# Patient Record
Sex: FEMALE | Race: WHITE | Employment: FULL TIME | URBAN - METROPOLITAN AREA
[De-identification: names, ages, dates, MRNs, and addresses within clinical notes are randomized per-mention and may not be internally consistent; named-entity substitution may affect disease eponyms.]

---

## 2022-06-08 ENCOUNTER — HOSPITAL ENCOUNTER (INPATIENT)
Age: 22
LOS: 3 days | Discharge: HOSPICE | DRG: 417 | End: 2022-06-11
Attending: EMERGENCY MEDICINE | Admitting: INTERNAL MEDICINE

## 2022-06-08 ENCOUNTER — APPOINTMENT (OUTPATIENT)
Dept: GENERAL RADIOLOGY | Age: 22
DRG: 417 | End: 2022-06-08

## 2022-06-08 ENCOUNTER — ANESTHESIA (OUTPATIENT)
Dept: ENDOSCOPY | Age: 22
DRG: 417 | End: 2022-06-08

## 2022-06-08 ENCOUNTER — ANESTHESIA EVENT (OUTPATIENT)
Dept: ENDOSCOPY | Age: 22
DRG: 417 | End: 2022-06-08

## 2022-06-08 ENCOUNTER — APPOINTMENT (OUTPATIENT)
Dept: ULTRASOUND IMAGING | Age: 22
DRG: 417 | End: 2022-06-08

## 2022-06-08 DIAGNOSIS — K85.10 ACUTE BILIARY PANCREATITIS, UNSPECIFIED COMPLICATION STATUS: ICD-10-CM

## 2022-06-08 DIAGNOSIS — K80.50 CHOLEDOCHOLITHIASIS: ICD-10-CM

## 2022-06-08 DIAGNOSIS — K85.10 GALLSTONE PANCREATITIS: ICD-10-CM

## 2022-06-08 DIAGNOSIS — K85.90 ACUTE PANCREATITIS, UNSPECIFIED COMPLICATION STATUS, UNSPECIFIED PANCREATITIS TYPE: Primary | ICD-10-CM

## 2022-06-08 DIAGNOSIS — K80.42 CHOLEDOCHOLITHIASIS WITH ACUTE CHOLECYSTITIS: ICD-10-CM

## 2022-06-08 PROBLEM — E87.1 HYPONATREMIA: Status: ACTIVE | Noted: 2022-06-08

## 2022-06-08 PROBLEM — E66.9 OBESITY (BMI 30-39.9): Chronic | Status: ACTIVE | Noted: 2022-06-08

## 2022-06-08 LAB
ALBUMIN SERPL-MCNC: 4.1 G/DL (ref 3.5–5)
ALBUMIN/GLOB SERPL: 1.1 {RATIO} (ref 1.2–3.5)
ALP SERPL-CCNC: 166 U/L (ref 50–136)
ALT SERPL-CCNC: 383 U/L (ref 12–65)
ANION GAP SERPL CALC-SCNC: 6 MMOL/L (ref 7–16)
AST SERPL-CCNC: 87 U/L (ref 15–37)
BASOPHILS # BLD: 0 K/UL (ref 0–0.2)
BASOPHILS NFR BLD: 0 % (ref 0–2)
BILIRUB SERPL-MCNC: 0.9 MG/DL (ref 0.2–1.1)
BILIRUB UR QL: ABNORMAL
BUN SERPL-MCNC: 8 MG/DL (ref 6–23)
CALCIUM SERPL-MCNC: 9.3 MG/DL (ref 8.3–10.4)
CHLORIDE SERPL-SCNC: 101 MMOL/L (ref 98–107)
CO2 SERPL-SCNC: 27 MMOL/L (ref 21–32)
CREAT SERPL-MCNC: 0.6 MG/DL (ref 0.6–1)
DIFFERENTIAL METHOD BLD: ABNORMAL
EOSINOPHIL # BLD: 0 K/UL (ref 0–0.8)
EOSINOPHIL NFR BLD: 0 % (ref 0.5–7.8)
ERYTHROCYTE [DISTWIDTH] IN BLOOD BY AUTOMATED COUNT: 12.9 % (ref 11.9–14.6)
GLOBULIN SER CALC-MCNC: 3.7 G/DL (ref 2.3–3.5)
GLUCOSE SERPL-MCNC: 107 MG/DL (ref 65–100)
GLUCOSE UR QL STRIP.AUTO: NEGATIVE MG/DL
HCG UR QL: NEGATIVE
HCT VFR BLD AUTO: 42.5 % (ref 35.8–46.3)
HGB BLD-MCNC: 14.3 G/DL (ref 11.7–15.4)
IMM GRANULOCYTES # BLD AUTO: 0.1 K/UL (ref 0–0.5)
IMM GRANULOCYTES NFR BLD AUTO: 1 % (ref 0–5)
KETONES UR-MCNC: >160 MG/DL
LACTATE SERPL-SCNC: 1.2 MMOL/L (ref 0.4–2)
LEUKOCYTE ESTERASE UR QL STRIP: NEGATIVE
LIPASE SERPL-CCNC: 6486 U/L (ref 73–393)
LYMPHOCYTES # BLD: 1 K/UL (ref 0.5–4.6)
LYMPHOCYTES NFR BLD: 4 % (ref 13–44)
MCH RBC QN AUTO: 28.9 PG (ref 26.1–32.9)
MCHC RBC AUTO-ENTMCNC: 33.6 G/DL (ref 31.4–35)
MCV RBC AUTO: 86 FL (ref 79.6–97.8)
MONOCYTES # BLD: 1.3 K/UL (ref 0.1–1.3)
MONOCYTES NFR BLD: 5 % (ref 4–12)
NEUTS SEG # BLD: 21.2 K/UL (ref 1.7–8.2)
NEUTS SEG NFR BLD: 90 % (ref 43–78)
NITRITE UR QL: NEGATIVE
NRBC # BLD: 0 K/UL (ref 0–0.2)
PH UR: 6 [PH] (ref 5–9)
PLATELET # BLD AUTO: 259 K/UL (ref 150–450)
PMV BLD AUTO: 11.6 FL (ref 9.4–12.3)
POTASSIUM SERPL-SCNC: 3.8 MMOL/L (ref 3.5–5.1)
PROT SERPL-MCNC: 7.8 G/DL (ref 6.3–8.2)
PROT UR QL: ABNORMAL MG/DL
RBC # BLD AUTO: 4.94 M/UL (ref 4.05–5.2)
RBC # UR STRIP: ABNORMAL /UL
SERVICE CMNT-IMP: ABNORMAL
SODIUM SERPL-SCNC: 134 MMOL/L (ref 136–145)
SP GR UR: 1.02 (ref 1–1.02)
UROBILINOGEN UR QL: 0.2 EU/DL (ref 0.2–1)
WBC # BLD AUTO: 23.6 K/UL (ref 4.3–11.1)

## 2022-06-08 PROCEDURE — 74330 X-RAY BILE/PANC ENDOSCOPY: CPT

## 2022-06-08 PROCEDURE — 36415 COLL VENOUS BLD VENIPUNCTURE: CPT

## 2022-06-08 PROCEDURE — 2580000003 HC RX 258: Performed by: NURSE ANESTHETIST, CERTIFIED REGISTERED

## 2022-06-08 PROCEDURE — 7100000000 HC PACU RECOVERY - FIRST 15 MIN: Performed by: INTERNAL MEDICINE

## 2022-06-08 PROCEDURE — C1769 GUIDE WIRE: HCPCS | Performed by: INTERNAL MEDICINE

## 2022-06-08 PROCEDURE — BF101ZZ FLUOROSCOPY OF BILE DUCTS USING LOW OSMOLAR CONTRAST: ICD-10-PCS | Performed by: INTERNAL MEDICINE

## 2022-06-08 PROCEDURE — 96365 THER/PROPH/DIAG IV INF INIT: CPT

## 2022-06-08 PROCEDURE — 6360000002 HC RX W HCPCS: Performed by: INTERNAL MEDICINE

## 2022-06-08 PROCEDURE — 3609014900 HC ERCP W/SPHINCTEROTOMY &/OR PAPILLOTOMY: Performed by: INTERNAL MEDICINE

## 2022-06-08 PROCEDURE — 81003 URINALYSIS AUTO W/O SCOPE: CPT

## 2022-06-08 PROCEDURE — 6360000002 HC RX W HCPCS: Performed by: NURSE ANESTHETIST, CERTIFIED REGISTERED

## 2022-06-08 PROCEDURE — 83605 ASSAY OF LACTIC ACID: CPT

## 2022-06-08 PROCEDURE — 0F7C8DZ DILATION OF AMPULLA OF VATER WITH INTRALUMINAL DEVICE, VIA NATURAL OR ARTIFICIAL OPENING ENDOSCOPIC: ICD-10-PCS | Performed by: INTERNAL MEDICINE

## 2022-06-08 PROCEDURE — 83690 ASSAY OF LIPASE: CPT

## 2022-06-08 PROCEDURE — 96375 TX/PRO/DX INJ NEW DRUG ADDON: CPT

## 2022-06-08 PROCEDURE — 81025 URINE PREGNANCY TEST: CPT

## 2022-06-08 PROCEDURE — 7100000001 HC PACU RECOVERY - ADDTL 15 MIN: Performed by: INTERNAL MEDICINE

## 2022-06-08 PROCEDURE — 2580000003 HC RX 258: Performed by: INTERNAL MEDICINE

## 2022-06-08 PROCEDURE — 76705 ECHO EXAM OF ABDOMEN: CPT

## 2022-06-08 PROCEDURE — 2580000003 HC RX 258: Performed by: PHYSICIAN ASSISTANT

## 2022-06-08 PROCEDURE — 6370000000 HC RX 637 (ALT 250 FOR IP): Performed by: INTERNAL MEDICINE

## 2022-06-08 PROCEDURE — 3700000000 HC ANESTHESIA ATTENDED CARE: Performed by: INTERNAL MEDICINE

## 2022-06-08 PROCEDURE — 2500000003 HC RX 250 WO HCPCS: Performed by: NURSE ANESTHETIST, CERTIFIED REGISTERED

## 2022-06-08 PROCEDURE — 80053 COMPREHEN METABOLIC PANEL: CPT

## 2022-06-08 PROCEDURE — 2709999900 HC NON-CHARGEABLE SUPPLY: Performed by: INTERNAL MEDICINE

## 2022-06-08 PROCEDURE — 99285 EMERGENCY DEPT VISIT HI MDM: CPT

## 2022-06-08 PROCEDURE — 1100000000 HC RM PRIVATE

## 2022-06-08 PROCEDURE — 87040 BLOOD CULTURE FOR BACTERIA: CPT

## 2022-06-08 PROCEDURE — 3700000001 HC ADD 15 MINUTES (ANESTHESIA): Performed by: INTERNAL MEDICINE

## 2022-06-08 PROCEDURE — 6360000004 HC RX CONTRAST MEDICATION: Performed by: INTERNAL MEDICINE

## 2022-06-08 PROCEDURE — 2720000010 HC SURG SUPPLY STERILE: Performed by: INTERNAL MEDICINE

## 2022-06-08 PROCEDURE — 85025 COMPLETE CBC W/AUTO DIFF WBC: CPT

## 2022-06-08 PROCEDURE — 96361 HYDRATE IV INFUSION ADD-ON: CPT

## 2022-06-08 PROCEDURE — 6360000002 HC RX W HCPCS: Performed by: PHYSICIAN ASSISTANT

## 2022-06-08 RX ORDER — ONDANSETRON 2 MG/ML
INJECTION INTRAMUSCULAR; INTRAVENOUS PRN
Status: DISCONTINUED | OUTPATIENT
Start: 2022-06-08 | End: 2022-06-08 | Stop reason: SDUPTHER

## 2022-06-08 RX ORDER — ONDANSETRON 8 MG/1
4 TABLET, ORALLY DISINTEGRATING ORAL EVERY 8 HOURS PRN
Status: DISCONTINUED | OUTPATIENT
Start: 2022-06-08 | End: 2022-06-11 | Stop reason: HOSPADM

## 2022-06-08 RX ORDER — ENOXAPARIN SODIUM 100 MG/ML
40 INJECTION SUBCUTANEOUS EVERY 24 HOURS
Status: DISCONTINUED | OUTPATIENT
Start: 2022-06-08 | End: 2022-06-10

## 2022-06-08 RX ORDER — OXYCODONE HYDROCHLORIDE 5 MG/1
5 TABLET ORAL EVERY 6 HOURS PRN
Status: DISCONTINUED | OUTPATIENT
Start: 2022-06-08 | End: 2022-06-11 | Stop reason: HOSPADM

## 2022-06-08 RX ORDER — ONDANSETRON 2 MG/ML
4 INJECTION INTRAMUSCULAR; INTRAVENOUS
Status: COMPLETED | OUTPATIENT
Start: 2022-06-08 | End: 2022-06-08

## 2022-06-08 RX ORDER — LIDOCAINE HYDROCHLORIDE 20 MG/ML
INJECTION, SOLUTION EPIDURAL; INFILTRATION; INTRACAUDAL; PERINEURAL PRN
Status: DISCONTINUED | OUTPATIENT
Start: 2022-06-08 | End: 2022-06-08 | Stop reason: SDUPTHER

## 2022-06-08 RX ORDER — PROPOFOL 10 MG/ML
INJECTION, EMULSION INTRAVENOUS PRN
Status: DISCONTINUED | OUTPATIENT
Start: 2022-06-08 | End: 2022-06-08 | Stop reason: SDUPTHER

## 2022-06-08 RX ORDER — SODIUM CHLORIDE 0.9 % (FLUSH) 0.9 %
5-40 SYRINGE (ML) INJECTION EVERY 12 HOURS SCHEDULED
Status: DISCONTINUED | OUTPATIENT
Start: 2022-06-08 | End: 2022-06-10

## 2022-06-08 RX ORDER — HYDROMORPHONE HYDROCHLORIDE 1 MG/ML
0.5 INJECTION, SOLUTION INTRAMUSCULAR; INTRAVENOUS; SUBCUTANEOUS EVERY 4 HOURS PRN
Status: DISCONTINUED | OUTPATIENT
Start: 2022-06-08 | End: 2022-06-11 | Stop reason: HOSPADM

## 2022-06-08 RX ORDER — SODIUM CHLORIDE 9 MG/ML
INJECTION, SOLUTION INTRAVENOUS PRN
Status: DISCONTINUED | OUTPATIENT
Start: 2022-06-08 | End: 2022-06-10

## 2022-06-08 RX ORDER — KETOROLAC TROMETHAMINE 15 MG/ML
15 INJECTION, SOLUTION INTRAMUSCULAR; INTRAVENOUS
Status: COMPLETED | OUTPATIENT
Start: 2022-06-08 | End: 2022-06-08

## 2022-06-08 RX ORDER — ACETAMINOPHEN 325 MG/1
650 TABLET ORAL EVERY 6 HOURS PRN
Status: DISCONTINUED | OUTPATIENT
Start: 2022-06-08 | End: 2022-06-11 | Stop reason: HOSPADM

## 2022-06-08 RX ORDER — POLYETHYLENE GLYCOL 3350 17 G/17G
17 POWDER, FOR SOLUTION ORAL DAILY PRN
Status: DISCONTINUED | OUTPATIENT
Start: 2022-06-08 | End: 2022-06-09

## 2022-06-08 RX ORDER — SODIUM CHLORIDE 0.9 % (FLUSH) 0.9 %
5-40 SYRINGE (ML) INJECTION PRN
Status: DISCONTINUED | OUTPATIENT
Start: 2022-06-08 | End: 2022-06-10

## 2022-06-08 RX ORDER — DEXAMETHASONE SODIUM PHOSPHATE 10 MG/ML
INJECTION INTRAMUSCULAR; INTRAVENOUS PRN
Status: DISCONTINUED | OUTPATIENT
Start: 2022-06-08 | End: 2022-06-08 | Stop reason: SDUPTHER

## 2022-06-08 RX ORDER — SODIUM CHLORIDE, SODIUM LACTATE, POTASSIUM CHLORIDE, CALCIUM CHLORIDE 600; 310; 30; 20 MG/100ML; MG/100ML; MG/100ML; MG/100ML
INJECTION, SOLUTION INTRAVENOUS CONTINUOUS PRN
Status: DISCONTINUED | OUTPATIENT
Start: 2022-06-08 | End: 2022-06-08 | Stop reason: SDUPTHER

## 2022-06-08 RX ORDER — ROCURONIUM BROMIDE 10 MG/ML
INJECTION, SOLUTION INTRAVENOUS PRN
Status: DISCONTINUED | OUTPATIENT
Start: 2022-06-08 | End: 2022-06-08 | Stop reason: SDUPTHER

## 2022-06-08 RX ORDER — ONDANSETRON 2 MG/ML
4 INJECTION INTRAMUSCULAR; INTRAVENOUS EVERY 6 HOURS PRN
Status: DISCONTINUED | OUTPATIENT
Start: 2022-06-08 | End: 2022-06-11 | Stop reason: HOSPADM

## 2022-06-08 RX ORDER — ACETAMINOPHEN 650 MG/1
650 SUPPOSITORY RECTAL EVERY 6 HOURS PRN
Status: DISCONTINUED | OUTPATIENT
Start: 2022-06-08 | End: 2022-06-10

## 2022-06-08 RX ORDER — 0.9 % SODIUM CHLORIDE 0.9 %
1000 INTRAVENOUS SOLUTION INTRAVENOUS
Status: COMPLETED | OUTPATIENT
Start: 2022-06-08 | End: 2022-06-08

## 2022-06-08 RX ORDER — GLYCOPYRROLATE 0.2 MG/ML
INJECTION INTRAMUSCULAR; INTRAVENOUS PRN
Status: DISCONTINUED | OUTPATIENT
Start: 2022-06-08 | End: 2022-06-08 | Stop reason: SDUPTHER

## 2022-06-08 RX ORDER — NEOSTIGMINE METHYLSULFATE 1 MG/ML
INJECTION, SOLUTION INTRAVENOUS PRN
Status: DISCONTINUED | OUTPATIENT
Start: 2022-06-08 | End: 2022-06-08 | Stop reason: SDUPTHER

## 2022-06-08 RX ORDER — SODIUM CHLORIDE 9 MG/ML
INJECTION, SOLUTION INTRAVENOUS CONTINUOUS
Status: DISCONTINUED | OUTPATIENT
Start: 2022-06-08 | End: 2022-06-09

## 2022-06-08 RX ADMIN — SODIUM CHLORIDE, SODIUM LACTATE, POTASSIUM CHLORIDE, AND CALCIUM CHLORIDE: 600; 310; 30; 20 INJECTION, SOLUTION INTRAVENOUS at 15:40

## 2022-06-08 RX ADMIN — PROPOFOL 200 MG: 10 INJECTION, EMULSION INTRAVENOUS at 15:49

## 2022-06-08 RX ADMIN — ONDANSETRON 4 MG: 2 INJECTION INTRAMUSCULAR; INTRAVENOUS at 15:58

## 2022-06-08 RX ADMIN — PHENYLEPHRINE HYDROCHLORIDE 100 MCG: 10 INJECTION INTRAVENOUS at 16:15

## 2022-06-08 RX ADMIN — SODIUM CHLORIDE: 9 INJECTION, SOLUTION INTRAVENOUS at 18:27

## 2022-06-08 RX ADMIN — GLYCOPYRROLATE 0.6 MG: 0.2 INJECTION, SOLUTION INTRAMUSCULAR; INTRAVENOUS at 16:25

## 2022-06-08 RX ADMIN — ENOXAPARIN SODIUM 40 MG: 100 INJECTION SUBCUTANEOUS at 21:30

## 2022-06-08 RX ADMIN — INDOMETHACIN 100 MG: 50 SUPPOSITORY RECTAL at 16:23

## 2022-06-08 RX ADMIN — ONDANSETRON 4 MG: 2 INJECTION INTRAMUSCULAR; INTRAVENOUS at 10:28

## 2022-06-08 RX ADMIN — Medication 5 MG: at 16:25

## 2022-06-08 RX ADMIN — KETOROLAC TROMETHAMINE 15 MG: 15 INJECTION, SOLUTION INTRAMUSCULAR; INTRAVENOUS at 10:29

## 2022-06-08 RX ADMIN — SODIUM CHLORIDE 1000 ML: 9 INJECTION, SOLUTION INTRAVENOUS at 10:28

## 2022-06-08 RX ADMIN — IOPAMIDOL 7.5 ML: 755 INJECTION, SOLUTION INTRAVENOUS at 16:25

## 2022-06-08 RX ADMIN — ROCURONIUM BROMIDE 35 MG: 50 INJECTION, SOLUTION INTRAVENOUS at 15:50

## 2022-06-08 RX ADMIN — PIPERACILLIN AND TAZOBACTAM 3375 MG: 3; .375 INJECTION, POWDER, LYOPHILIZED, FOR SOLUTION INTRAVENOUS at 18:28

## 2022-06-08 RX ADMIN — LIDOCAINE HYDROCHLORIDE 80 MG: 20 INJECTION, SOLUTION EPIDURAL; INFILTRATION; INTRACAUDAL; PERINEURAL at 15:49

## 2022-06-08 RX ADMIN — DEXAMETHASONE SODIUM PHOSPHATE 10 MG: 10 INJECTION INTRAMUSCULAR; INTRAVENOUS at 15:58

## 2022-06-08 RX ADMIN — PIPERACILLIN AND TAZOBACTAM 4500 MG: 4; .5 INJECTION, POWDER, LYOPHILIZED, FOR SOLUTION INTRAVENOUS at 12:05

## 2022-06-08 ASSESSMENT — PAIN - FUNCTIONAL ASSESSMENT
PAIN_FUNCTIONAL_ASSESSMENT: NONE - DENIES PAIN
PAIN_FUNCTIONAL_ASSESSMENT: 0-10
PAIN_FUNCTIONAL_ASSESSMENT: 0-10

## 2022-06-08 ASSESSMENT — ENCOUNTER SYMPTOMS
RHINORRHEA: 0
VOMITING: 1
ABDOMINAL PAIN: 1
NAUSEA: 1
EYE REDNESS: 0
BACK PAIN: 0
SORE THROAT: 0
COUGH: 0
CHEST TIGHTNESS: 0
SHORTNESS OF BREATH: 0
DIARRHEA: 0
ABDOMINAL DISTENTION: 0

## 2022-06-08 ASSESSMENT — PAIN SCALES - GENERAL: PAINLEVEL_OUTOF10: 5

## 2022-06-08 ASSESSMENT — PAIN DESCRIPTION - DESCRIPTORS
DESCRIPTORS: DISCOMFORT;CRAMPING
DESCRIPTORS: DULL;OTHER (COMMENT)

## 2022-06-08 ASSESSMENT — PAIN DESCRIPTION - LOCATION: LOCATION: ABDOMEN

## 2022-06-08 NOTE — INTERVAL H&P NOTE
Update History & Physical    The patient's History and Physical of June 8, 2022   was reviewed with the patient and I examined the patient. There was no change. The surgical site was confirmed by the patient and me. Plan: The risks, benefits, expected outcome, and alternative to the recommended procedure have been discussed with the patient. Patient understands and wants to proceed with the procedure.      Electronically signed by Corrie Lobo MD on 6/8/2022 at 3:41 PM

## 2022-06-08 NOTE — ANESTHESIA POSTPROCEDURE EVALUATION
Department of Anesthesiology  Postprocedure Note    Patient: Selma Escamilla  MRN: [id number]  [place]: 2000  Date of evaluation: 6/8/2022  Time:  5:33 PM     Procedure Summary     Date: 06/08/22 Room / Location: [facility] ENDO FLOURO 1 / [facility] ENDOSCOPY    Anesthesia Start: 1540 Anesthesia Stop: 7157    Procedure: ERCP SPHINCTER/PAPILLOTOMY with balloon sweep (N/A Upper GI Region) Diagnosis:       Elevated liver enzymes      Acute pancreatitis, unspecified complication status, unspecified pancreatitis type      (Elevated liver enzymes [R74.8])      (Acute pancreatitis, unspecified complication status, unspecified pancreatitis type [K85.90])    Surgeons: Isai Alva MD Responsible Provider: Aquilse Fenton MD    Anesthesia Type: general ASA Status: 3          Anesthesia Type: general    Moses Phase I: Moses Score: 8    Moses Phase II:      Last vitals: Reviewed and per EMR flowsheets.        Anesthesia Post Evaluation    Patient location during evaluation: PACU  Patient participation: complete - patient participated  Level of consciousness: awake and alert  Pain score: 2  Airway patency: patent  Nausea & Vomiting: no nausea and no vomiting  Complications: no  Cardiovascular status: blood pressure returned to baseline  Respiratory status: acceptable  Hydration status: euvolemic  Comments: /73   Pulse 64   Temp 98.7 °F (37.1 °C) (Temporal)   Resp 16   Ht 5' 5\" (1.651 m)   Wt 193 lb (87.5 kg)   SpO2 100%   BMI 32.12 kg/m²   Multimodal analgesia pain management approach

## 2022-06-08 NOTE — CONSULTS
[facility] Consult Note       Primary GI Physician: Dilma Celaya MD (new)  Referring Provider: Aurora Kumar PA-C    Consult Date:  6/8/2022  Admit Date:  6/8/2022    Chief Complaint:  Gallstone pancreatitis    Subjective:     History of Present Illness:  Patient is a 23 y.o. female with known gallstones, but otherwise no other significant PMHx who is seen in consultation at the request of Aurora Kumar PA-C for further evaluation of gallstone pancreatitis. Patient presented to the ED today with a 2 day history of moderate to severe, constant upper abdominal pressure pains. + N/V, food stuff emesis. No CG emesis, hematemesis, or melena. No F/C. Patient has had similar abdominal pains in the past. She reports having seen a surgeon 4 years ago for same. Surgeon recommended CCY, but patient elected to defer surgery due to lack of insurance. She has been seeing a Chiropractor who has treated her gallstones with bile salts and a medication to keep her bile ducts dilated. She denies tobacco, NSAIDs, or alcohol use. Her pain has improved in the ER after getting Toradol. Labs in the ED revealed , , AST 87, bili 0.9, lipase of 6486, and WBC of 23k. She denies prior hx of pancreatitis. RUQ abdominal US showed cholelithiasis with mild GB wall thickening and dilated CBD and intrahepatic ducts. She has multiple family members with GB disease especially on her mother's side. PMH:  Gallstones    PSH:  None.     Allergies:  No Known Allergies    Home Medications:  Prior to Admission medications    Not on [facility] Medications:  Current Facility-Administered Medications   Medication Dose Route Frequency    piperacillin-tazobactam (ZOSYN) 3,375 mg in sodium chloride 0.9 % 50 mL IVPB (mini-bag)  3,375 mg IntraVENous Once    Followed by    piperacillin-tazobactam (ZOSYN) 3,375 mg in sodium chloride 0.9 % 50 mL IVPB (mini-bag)  3,375 mg IntraVENous Q8H     No current outpatient medications on file. Social History:  Social History     Tobacco Use    Smoking status: Not on file    Smokeless tobacco: Not on file   Substance Use Topics    Alcohol use: Not on file       Pt denies any history of drug use, blood transfusions, or tattoos. Family History:  Multiple family members with GB disease. Review of Systems:  A detailed 10 system ROS is obtained, with pertinent positives as listed above. All others are negative. Diet:  NPO    Objective:     Physical Exam:  Vitals:  BP (!) 135/97   Pulse 100   Temp 98.4 °F (36.9 °C) (Oral)   Resp 18   Ht 5' 5\" (1.651 m)   Wt 193 lb (87.5 kg)   SpO2 99%   BMI 32.12 kg/m²   Gen:  Pt is alert, cooperative, NAD. Skin:  Extremities and face reveal no rashes. HEENT: Sclerae anicteric. Extra-occular muscles are intact. No oral ulcers. No abnormal pigmentation of the lips. The neck is supple. Cardiovascular: SINUS TACHY. No murmurs, gallops, or rubs. Respiratory:  Comfortable breathing with no accessory muscle use. Clear breath sounds anteriorly with no wheezes, rales, or rhonchi. GI:  Abdomen nondistended, soft, and MILDLY TTP EPIGASTRIC without G/R.  NABS. No enlargement of the liver or spleen. No masses palpable. Rectal:  Deferred  Musculoskeletal:  No pitting edema of the lower legs. Neurological:  Gross memory appears intact. Patient is alert and oriented. Psychiatric:  Mood appears appropriate with judgement intact. Lymphatic:  No cervical or supraclavicular adenopathy. Laboratory:    Recent Labs     06/08/22  0938   WBC 23.6*   HGB 14.3   HCT 42.5      MCV 86.0   *   K 3.8      CO2 27   BUN 8   AST 87*   *          Assessment:     24 y/o F with known gallstones who presents with recent onset of upper abdominal pains with N/V. Work-up in the ER revealed , AST 87, , and normal T bili. Lipase elevated in the 6000s. WBC 23k.  US revealed dilated CBD and intrahepatics raising the suspicion for CBD stone / gallstone pancreatitis, gallstones +/- acute or chronic cholecystitis. She saw a surgeon several years ago for similar abdominal pains and at that time deferred CCY due to lack of insurance. Plan:     ERCP later today to evaluate biliary tree and to remove gallstones, sludge, debris etc. The risks of procedure were discussed with patient including anesthesia, bleeding, infection, perforation, or worsening pancreatitis. Patient verbalizes understanding and wishes to pursue ERCP. Hospitalist to admit. Agree with surgical consult. Anti-emetics and pain mgt per primary. Further recs pending ERCP results. Patient is seen and examined in collaboration with Dr. Ghulam Horn. Assessment and plan as per Dr. Robert Billings.       Deborah Fuentes PA-C  [facility]

## 2022-06-08 NOTE — ED NOTES
TRANSFER - OUT REPORT:    Verbal report given to endoscopy RN on [facility]  being transferred to endoscopy  for ordered procedure       Report consisted of patient's Situation, Background, Assessment and   Recommendations(SBAR). Information from the following report(s) Nurse Handoff Report, ED SBAR, [facility] and Recent Results was reviewed with the receiving nurse. Lines:   Peripheral IV 06/08/22 Right Antecubital (Active)        Opportunity for questions and clarification was provided.       Patient transported with:  Dione Cross RN  06/08/22 4350

## 2022-06-08 NOTE — CONSULTS
H&P/Consult Note/Progress Note/Office Note:   Angela Acosta  MRN: [id number]  GEQ:[birth year]  Age:22 y.o.    HPI: Angela Acosta is a 23 y.o. female who came to the ER and was admitted by the Hospitalists with a  3 day h/o progressive N/V/upper abd pain/epigastric pain. Nothing made pain better or worse. Pain 5/10 on arrival   No associated fever. She has been known to have gallstones for 3 yrs but trying homeopathic treatment instead of surgery    In ER WBC 23.6k, T Bili 0.9, AST/ALT 87/383, alk phosp 166  Lipase 6486    GI and General Surgery were consulted by MUNA Ellison  She had ERCP on day of admission  6/8/22 s/p ERCP; Dr Scot Swann  CBD 15mm, minimal sludge and likely a prior passed stone; pancreas normal, GB not seen        6/8/22 RUQ Abd US  Homogeneous echotexture throughout the liver. There is mild    intrahepatic biliary ductal dilatation. There is no focal hepatic mass. Gallstones are present within the gallbladder lumen. There is gallbladder wall   thickening measuring 5 mm. No pericholecystic fluid. There was a   reported negative sonographic Jackson's sign. CBD dilated measuring 14mm. Visualized portions of the pancreas were unremarkable however the tail was   obscured by overlying bowel gas. The right kidney is unremarkable without   hydronephrosis or solid mass. The right kidney was measured at approximately 11.1cm. The visualized portions of the abdominal aorta and inferior vena cava are   unremarkable. There is normal hepatopetal flow within the main portal vein. No   free fluid is seen within the right upper quadrant. mpression   1. Cholelithiasis with mild gallbladder wall thickening raising the possibility   of acute or chronic cholecystitis. 2. Dilated common bile duct as well as intrahepatic biliary ductal dilatation. A   distal stone or stricture could account for this appearance.  An MRCP or ERCP may   be beneficial there is further clinical concern. History reviewed. No pertinent past medical history. Past Surgical History:   Procedure Laterality Date    ERCP W/ SPHICTEROTOMY N/A 06/08/2022     Current Facility-Administered Medications   Medication Dose Route Frequency    dextrose 5 % and 0.45 % NaCl with KCl 20 mEq infusion   IntraVENous Continuous    sodium chloride flush 0.9 % injection 5-40 mL  5-40 mL IntraVENous 2 times per day    sodium chloride flush 0.9 % injection 5-40 mL  5-40 mL IntraVENous PRN    0.9 % sodium chloride infusion   IntraVENous PRN    enoxaparin (LOVENOX) injection 40 mg  40 mg SubCUTAneous Q24H    ondansetron (ZOFRAN-ODT) disintegrating tablet 4 mg  4 mg Oral Q8H PRN    Or    ondansetron (ZOFRAN) injection 4 mg  4 mg IntraVENous Q6H PRN    acetaminophen (TYLENOL) tablet 650 mg  650 mg Oral Q6H PRN    Or    acetaminophen (TYLENOL) suppository 650 mg  650 mg Rectal Q6H PRN    piperacillin-tazobactam (ZOSYN) 3,375 mg in sodium chloride 0.9 % 50 mL IVPB (mini-bag)  3,375 mg IntraVENous q8h    HYDROmorphone HCl PF (DILAUDID) injection 0.5 mg  0.5 mg IntraVENous Q4H PRN    oxyCODONE (ROXICODONE) immediate release tablet 5 mg  5 mg Oral Q6H PRN    indomethacin (INDOCIN) 50 MG suppository 100 mg  100 mg Rectal Q12H PRN    glucagon (rDNA) injection 1 mg  1 mg IntraVENous Once     Patient has no known allergies.   Social History     Socioeconomic History    Marital status: Single     Spouse name: None    Number of children: None    Years of education: None    Highest education level: None   Occupational History    None   Tobacco Use    Smoking status: Never Smoker    Smokeless tobacco: Never Used   Substance and Sexual Activity    Alcohol use: None    Drug use: None    Sexual activity: None   Other Topics Concern    None   Social History Narrative    None     Social Determinants of Health     Financial Resource Strain:     Difficulty of Paying Living Expenses: Not on file   Food Insecurity:     Worried About Running Out of Food in the Last Year: Not on file    Dm of Food in the Last Year: Not on file   Transportation Needs:     Lack of Transportation (Medical): Not on file    Lack of Transportation (Non-Medical): Not on file   Physical Activity:     Days of Exercise per Week: Not on file    Minutes of Exercise per Session: Not on file   Stress:     Feeling of Stress : Not on file   Social Connections:     Frequency of Communication with Friends and Family: Not on file    Frequency of Social Gatherings with Friends and Family: Not on file    Attends Mu-ism Services: Not on file    Active Member of [address] [place] [address] [employer] or Organizations: Not on file    Attends Club or Organization Meetings: Not on file    Marital Status: Not on file   Intimate Partner Violence:     Fear of Current or Ex-Partner: Not on file    Emotionally Abused: Not on file    Physically Abused: Not on file    Sexually Abused: Not on file   Housing Stability:     Unable to Pay for Housing in the Last Year: Not on file    Number of [place] in the Last Year: Not on file    Unstable Housing in the Last Year: Not on file     Social History     Tobacco Use   Smoking Status Never Smoker   Smokeless Tobacco Never Used     History reviewed. No pertinent family history. ROS: The patient has no difficulty with chest pain or shortness of breath. No fever or chills. Comprehensive review of systems was otherwise unremarkable except as noted above.     Physical Exam:   /67   Pulse 63   Temp 98.3 °F (36.8 °C) (Oral)   Resp 16   Ht 5' 5\" (1.651 m)   Wt 193 lb (87.5 kg)   SpO2 97%   BMI 32.12 kg/m²   Vitals:    06/08/22 1749 06/08/22 1920 06/08/22 2348 06/09/22 0428   BP: 122/73 114/65 122/85 120/67   Pulse: 74 70 67 63   Resp: 16 16 16 16   Temp: 98.6 °F (37 °C) 98.6 °F (37 °C) 98.6 °F (37 °C) 98.3 °F (36.8 °C)   TempSrc: Oral Oral Oral Oral   SpO2: 98% 97% 96% 97%   Weight:       Height:         06/08 1901 - 06/09 0700  In: 911 [I.V.:911]  Out: -   06/07 0701 - 06/08 1900  In: 1750 [I.V.:650]  Out: 0     Constitutional: Alert, oriented, cooperative patient in no acute distress; appears stated age    Eyes:Sclera are clear. EOMs intact  ENMT: no external lesions gross hearing normal; no obvious neck masses, no ear or lip lesions, nares normal  CV: RRR. Normal perfusion  Resp: No JVD. Breathing is  non-labored; no audible wheezing. GI: soft and non-distended; epigastric tenderness     Musculoskeletal: unremarkable with normal function. No embolic signs or cyanosis.    Neuro:  Oriented; moves all 4; no focal deficits  Psychiatric: normal affect and mood, no memory impairment    Recent vitals (if inpt):  Patient Vitals for the past 24 hrs:   BP Temp Temp src Pulse Resp SpO2 Height Weight   06/09/22 0428 120/67 98.3 °F (36.8 °C) Oral 63 16 97 %     06/08/22 2348 122/85 98.6 °F (37 °C) Oral 67 16 96 %     06/08/22 1920 114/65 98.6 °F (37 °C) Oral 70 16 97 %     06/08/22 1749 122/73 98.6 °F (37 °C) Oral 74 16 98 %     06/08/22 1735 121/67 98 °F (36.7 °C) Temporal 76 16 97 %     06/08/22 1730 125/69   63 15 100 %     06/08/22 1725 128/73   64 16 100 %     06/08/22 1720 130/69   62 16 100 %     06/08/22 1715 124/67   71 16 100 %     06/08/22 1710 126/74   65 15 100 %     06/08/22 1705 124/69   67 14 100 %     06/08/22 1700 125/67   69 16 100 %     06/08/22 1654 121/68   72 16 100 %     06/08/22 1649 125/65 98.7 °F (37.1 °C) Temporal 80 14 100 %     06/08/22 1404 129/73 98 °F (36.7 °C) Oral 85 16 98 % 5' 5\" (1.651 m) 193 lb (87.5 kg)   06/08/22 1319 127/78     98 %     06/08/22 1300 120/72     96 %     06/08/22 1249 125/82     96 %     06/08/22 1234 116/81     97 %     06/08/22 1215 102/71     98 %     06/08/22 1003 120/81     97 %     06/08/22 0948 (!) 115/97     97 %     06/08/22 0931 (!) 135/97 98.4 °F (36.9 °C) Oral 100 18 99 % 5' 5\" (1.651 m) 193 lb (87.5 kg)       Amount and/or Complexity of Data Reviewed and Analyzed:  I reviewed and analyzed all of the unique labs and radiologic studies that are shown below as well as any that are in the HPI, and any that are in the expanded problem list below  *Each unique test, order, or document contributes to the combination of 2 or combination of 3 in Category 1 below. For this visit I also reviewed old records and prior notes. Recent Labs     06/09/22  0327   WBC 18.9*   HGB 11.6*         K 3.6      CO2 22   BUN 10   *     Review of most recent CBC  Lab Results   Component Value Date    WBC 18.9 (H) 06/09/2022    HGB 11.6 (L) 06/09/2022    HCT 35.3 (L) 06/09/2022    MCV 89.4 06/09/2022     06/09/2022       Review of most recent BMP  Lab Results   Component Value Date     06/09/2022    K 3.6 06/09/2022     06/09/2022    CO2 22 06/09/2022    BUN 10 06/09/2022    CREATININE 0.50 06/09/2022    GLUCOSE 75 06/09/2022    CALCIUM 8.5 06/09/2022        Review of most recent LFTs (and lipase if done)  Lab Results   Component Value Date     (H) 06/09/2022    AST 38 (H) 06/09/2022    ALKPHOS 125 06/09/2022    BILITOT 0.6 06/09/2022     Lab Results   Component Value Date    LIPASE 6,486 (H) 06/08/2022       No results found for: INR, APTT, LCAD    Review of most recent HgbA1c  No results found for: LABA1C  No results found for: EAG    Nutritional assessment screen for wound healing issues:  Lab Results   Component Value Date    LABALBU 3.1 (L) 06/09/2022       @lastcovr@    Xray Result (most recent):  No results found for this or any previous visit from the past 3650 days. CT Result (most recent):  No results found for this or any previous visit from the past 3650 days.     US Result (most recent):  US ABDOMEN LIMITED 06/08/2022    Narrative  Right upper quadrant ultrasound    HISTORY: Nausea and vomiting, history of gallstones    Real-time sonography of the right upper quadrant was performed. Comparison: None    FINDINGS: There is homogeneous echotexture throughout the liver. There is mild  intrahepatic biliary ductal dilatation. There is no focal hepatic mass. Gallstones are present within the gallbladder lumen. There is gallbladder wall  thickening measuring 5 mm. There is no pericholecystic fluid. There was a  reported negative sonographic Jackson's sign. The common bile duct is dilated  measuring 14 mm. The visualized portions of the pancreas were unremarkable however the tail was  obscured by overlying bowel gas. The right kidney is unremarkable without  hydronephrosis or solid mass. The right kidney was measured at approximately  11.1 cm. The visualized portions of the abdominal aorta and inferior vena cava are  unremarkable. There is normal hepatopetal flow within the main portal vein. No  free fluid is seen within the right upper quadrant. Impression  1. Cholelithiasis with mild gallbladder wall thickening raising the possibility  of acute or chronic cholecystitis. 2. Dilated common bile duct as well as intrahepatic biliary ductal dilatation. A  distal stone or stricture could account for this appearance. An MRCP or ERCP may  be beneficial there is further clinical concern. Admission date (for inpatients): 6/8/2022   Day of Surgery  Procedure(s):  ERCP ENDOSCOPIC RETROGRADE CHOLANGIOPANCREATOGRAPHY  ER 1        ASSESSMENT/PLAN:  [unfilled]  Principal Problem:    Gallstone pancreatitis  Active Problems:    Choledocholithiasis with acute cholecystitis    Hyponatremia    Obesity (BMI 30-39. 9)    Leukocytosis    Elevated LFTs    Dilated cbd, acquired  Resolved Problems:    * No resolved hospital problems.  *     Patient Active Problem List    Diagnosis Date Noted    Gallstone pancreatitis 06/09/2022     Priority: Medium    Leukocytosis 06/09/2022     Priority: Medium    Elevated LFTs 06/09/2022     Priority: Medium    Dilated cbd, acquired 06/09/2022     Priority: Medium    Choledocholithiasis with acute cholecystitis 06/08/2022     Priority: Medium    Hyponatremia 06/08/2022     Priority: Medium    Obesity (BMI 30-39.9) 06/08/2022     Priority: Medium          Number and Complexity of Problems addressed and   Risks of complications and/or morbidity of management          Acute gallstone pancreatitis with biliary obstruction  Admit inpt  WBC 23.6--->18.9k  s/p ERCP on 6/8/22 with 15mm CBD with sludge; likely passed stone    NPO/IVF/IV Abx    Await resolution of pancreatitis  Interval cholecystectomy prior to discharge            Level of MDM (2/3 elements below)  Number and Complexity of Problems Addressed Amount and/or Complexity of Data to be Reviewed and Analyzed  *Each unique test, order, or document contributes to the combination of 2 or combination of 3 in Category 1 below.  Risk of Complications and/or Morbidity or Mortality of pt Management     56275  39754 SF Minimal  1self-limited or minor problem Minimal or none Minimal risk of morbidity from additional diagnostic testing or Rx   61878  05795 Low Low  2or more self-limited or minor problems;    or  1stable chronic illness;    or  3NOQFI, uncomplicated illness or injury   Limited  (Must meet the requirements of at least 1 of the 2 categories)  Category 1: Tests and documents   Any combination of 2 from the following:  Review of prior external note(s) from each unique source*;  review of the result(s) of each unique test*;   ordering of each unique test*    or   Category 2: Assessment requiring an independent historian(s)  (For the categories of independent interpretation of tests and discussion of management or test interpretation, see moderate or high) Low risk of morbidity from additional diagnostic testing or treatment     28947  27069 Mod Moderate  1or more chronic illnesses with exacerbation, progression, or side effects of treatment;    or  2or more stable chronic illnesses;    or  1undiagnosed new problem with uncertain prognosis;    or  1acute illness with systemic symptoms;    or  [id number] complicated injury   Moderate  (Must meet the requirements of at least 1 out of 3 categories)  Category 1: Tests, documents, or independent historian(s)  Any combination of 3 from the following:   Review of prior external note(s) from each unique source*;  Review of the result(s) of each unique test*;  Ordering of each unique test*;  Assessment requiring an independent historian(s)    or  Category 2: Independent interpretation of tests   Independent interpretation of a test performed by another physician/other qualified health care professional (not separately reported);     or  Category 3: Discussion of management or test interpretation  Discussion of management or test interpretation with external physician/other qualified health care professional/appropriate source (not separately reported)   Moderate risk of morbidity from additional diagnostic testing or treatment  Examples only:  Prescription drug management   Decision regarding minor surgery with identified patient or procedure risk factors  Decision regarding elective major surgery without identified patient or procedure risk factors   Diagnosis or treatment significantly limited by social determinants of health       96967  75874 High High  1or more chronic illnesses with severe exacerbation, progression, or side effects of treatment;    or  1 acute or chronic illness or injury that poses a threat to life or bodily function   Extensive  (Must meet the requirements of at least 2 out of 3 categories)  Category 1: Tests, documents, or independent historian(s)  Any combination of 3 from the following:   Review of prior external note(s) from each unique source*;  Review of the result(s) of each unique test*;   Ordering of each unique test*;   Assessment requiring an independent historian(s)    or   Category 2: Independent interpretation of tests   Independent interpretation of a test performed by another physician/other qualified health care professional (not separately reported);     or  Category 3: Discussion of management or test interpretation  Discussion of management or test interpretation with external physician/other qualified health care professional/appropriate source (not separately reported)   High risk of morbidity from additional diagnostic testing or treatment  Examples only:  Drug therapy requiring intensive monitoring for toxicity  Decision regarding elective major surgery with identified patient or procedure risk factors  Decision regarding emergency major surgery  Decision regarding hospitalization  Decision not to resuscitate or to de-escalate care because of poor prognosis             I have personally performed a face-to-face diagnostic evaluation and management  service on this patient. I have independently seen the patient. I have independently obtained the above history from the patient/family. I have independently examined the patient with above findings. I have independently reviewed data/labs for this patient and developed the above plan of care (MDM).       Signed: Christal Larios MD  6/9/2022    6:25 AM

## 2022-06-08 NOTE — ED TRIAGE NOTES
Pt ambulatory to triage. Pt states that since Monday she has had gas and bloating in her stomach. Pt states she was dx with gallstones 3 years and has been trying to manage them at home. Pt states she cannot keep any food or liquid down x2days now. Pt states the pain is diffuse all over the abdomen and wraps around to her back. Pt denies fever/chills, shob, chest pain, denies dysuria, hematuria, urinary urgency or frequency.

## 2022-06-08 NOTE — H&P
Hospitalist History and Physical   Admit Date:  2022  9:40 AM   Name:  Darlene Merino   Age:  23 y.o. Sex:  female  :  2000   MRN:  [id number]   Room:  ER01/    Presenting Complaint: Abdominal Pain     Reason(s) for Admission: Choledocholithiasis with acute cholecystitis [K80.42]     History of Present Illness:     Darlene Merino is a 23 y.o. female with medical history of gallstones, obesity evaluated with 3 days of nausea, emesis, abdominal pain. She is found with elevated lipase and liver functions. ABDOMINAL US shows \"     Impression   1. Cholelithiasis with mild gallbladder wall thickening raising the possibility   of acute or chronic cholecystitis. 2. Dilated common bile duct as well as intrahepatic biliary ductal dilatation. A   distal stone or stricture could account for this appearance. An MRCP or ERCP may   be beneficial there is further clinical concern. \"    She has received zosyn in the ED. She does not meet sepsis critteria, though has leukocytosis >23K. GI/ surgery are consulted. UA and UPT negative. Pain over RUQ. Unable to keep food down. No diarrhea. denies fever, no ear or throat pain, some weight loss, no sweats, no edema, no chest pain or cough        Emergency contact- mother Veryl Mis [phone]  No home meds      Review of Systems:  10 systems reviewed and negative except as noted in HPI.         Assessment & Plan:     Principal Problem:    Choledocholithiasis with acute cholecystitis  Plan:   · Admit to medical bed  · NPO  ·  cc/hr   · Zosyn D1  · followup blood cultures   · Trend liver enzymes  · GI/ surgery consults   · Consider MRCP/ERCP- defer to GI        Active Problems:    Hyponatremia  Plan:   ·   · Trend BMP with hydration       Obesity:  · Needs lifestyle modification           Dispo/Discharge Planning:     Pending to home     Diet: Diet NPO  VTE ppx: lovenox  Code status: FULL         Hospital Problems:  Principal Problem: Choledocholithiasis with acute cholecystitis  Active Problems:    Hyponatremia  Resolved Problems:    * No resolved hospital problems. *       Past History:   past medical history -gallstones, obesity       past surgical history on file. - wisdom teeth      No Known Allergies   Social History     Tobacco Use    Smoking status: No    Smokeless tobacco: No   Substance Use Topics    Alcohol use: No       family history -CHF, HTN, skin cancer, HLP, , gallstones           There is no immunization history on file for this patient. Prior to Admit Medications:  No current outpatient medications      Objective:     Patient Vitals for the past 24 hrs:   Temp Pulse Resp BP SpO2   06/08/22 1215    102/71 98 %   06/08/22 1003    120/81 97 %   06/08/22 0948    (!) 115/97 97 %   06/08/22 0931 98.4 °F (36.9 °C) 100 18 (!) 135/97 99 %       Oxygen Therapy  SpO2: 98 %  O2 Device: None (Room air)    Estimated body mass index is 32.12 kg/m² as calculated from the following:    Height as of this encounter: 5' 5\" (1.651 m). Weight as of this encounter: 193 lb (87.5 kg). Intake/Output Summary (Last 24 hours) at 6/8/2022 1227  Last data filed at 6/8/2022 1129  Gross per 24 hour   Intake 1000 ml   Output    Net 1000 ml         Physical Exam:    Blood pressure 102/71, pulse 100, temperature 98.4 °F (36.9 °C), temperature source Oral, resp. rate 18, height 5' 5\" (1.651 m), weight 193 lb (87.5 kg), SpO2 98 %. General:    Well nourished. No overt distress, obese, pleasant   Head:  Normocephalic, atraumatic  Eyes:  Sclerae appear normal.  Pupils equally round. ENT:  Nares appear normal, no drainage. Moist oral mucosa, hirsutism  Neck:  No restricted ROM. Trachea midline   CV:   RRR. No m/r/g. No jugular venous distension. Lungs:   CTAB. No wheezing, rhonchi, or rales. Respirations even, unlabored  Abdomen: Bowel sounds present. Soft, tender, nondistended. Extremities: No cyanosis or clubbing.   No edema  Skin: No rashes and normal coloration. Warm and dry. Neuro:   grossly intact. Psych:  Normal mood and affect.       I have reviewed ordered lab tests and independently visualized imaging below:    Last 24hr Labs:  Recent Results (from the past 24 hour(s))   CBC with Diff    Collection Time: 06/08/22  9:38 AM   Result Value Ref Range    WBC 23.6 (H) 4.3 - 11.1 K/uL    RBC 4.94 4.05 - 5.2 M/uL    Hemoglobin 14.3 11.7 - 15.4 g/dL    Hematocrit 42.5 35.8 - 46.3 %    MCV 86.0 79.6 - 97.8 FL    MCH 28.9 26.1 - 32.9 PG    MCHC 33.6 31.4 - 35.0 g/dL    RDW 12.9 11.9 - 14.6 %    Platelets 750 821 - 208 K/uL    MPV 11.6 9.4 - 12.3 FL    nRBC 0.00 0.0 - 0.2 K/uL    Differential Type AUTOMATED      Seg Neutrophils 90 (H) 43 - 78 %    Lymphocytes 4 (L) 13 - 44 %    Monocytes 5 4.0 - 12.0 %    Eosinophils % 0 (L) 0.5 - 7.8 %    Basophils 0 0.0 - 2.0 %    Immature Granulocytes 1 0.0 - 5.0 %    Segs Absolute 21.2 (H) 1.7 - 8.2 K/UL    Absolute Lymph # 1.0 0.5 - 4.6 K/UL    Absolute Mono # 1.3 0.1 - 1.3 K/UL    Absolute Eos # 0.0 0.0 - 0.8 K/UL    Basophils Absolute 0.0 0.0 - 0.2 K/UL    Absolute Immature Granulocyte 0.1 0.0 - 0.5 K/UL   CMP    Collection Time: 06/08/22  9:38 AM   Result Value Ref Range    Sodium 134 (L) 136 - 145 mmol/L    Potassium 3.8 3.5 - 5.1 mmol/L    Chloride 101 98 - 107 mmol/L    CO2 27 21 - 32 mmol/L    Anion Gap 6 (L) 7 - 16 mmol/L    Glucose 107 (H) 65 - 100 mg/dL    BUN 8 6 - 23 MG/DL    CREATININE 0.60 0.6 - 1.0 MG/DL    GFR African American >60 >60 ml/min/1.73m2    GFR Non- >60 >60 ml/min/1.73m2    Calcium 9.3 8.3 - 10.4 MG/DL    Total Bilirubin 0.9 0.2 - 1.1 MG/DL     (H) 12 - 65 U/L    AST 87 (H) 15 - 37 U/L    Alk Phosphatase 166 (H) 50 - 136 U/L    Total Protein 7.8 6.3 - 8.2 g/dL    Albumin 4.1 3.5 - 5.0 g/dL    Globulin 3.7 (H) 2.3 - 3.5 g/dL    Albumin/Globulin Ratio 1.1 (L) 1.2 - 3.5     Lipase    Collection Time: 06/08/22  9:38 AM   Result Value Ref Range    Lipase 6,486 (H) 73 - 393 U/L   Lactic Acid    Collection Time: 06/08/22 10:51 AM   Result Value Ref Range    Lactic Acid, Plasma 1.2 0.4 - 2.0 MMOL/L   POCT Urinalysis no Micro    Collection Time: 06/08/22 10:57 AM   Result Value Ref Range    Specific Gravity, Urine, POC 1.025 (H) 1.001 - 1.023      pH, Urine, POC 6.0 5.0 - 9.0      Protein, Urine, POC TRACE (A) NEG mg/dL    Glucose, UA POC Negative NEG mg/dL    KETONES, Urine, POC >160 (A) NEG mg/dL    Bilirubin, Urine, POC SMALL (A) NEG      Blood, UA POC Trace Intact (A) NEG      URINE UROBILINOGEN POC 0.2 0.2 - 1.0 EU/dL    Nitrate, Urine, POC Negative NEG      Leukocyte Est, UA POC Negative NEG      Performed by: Josue Valverde    POC Pregnancy Urine Qual    Collection Time: 06/08/22 10:58 AM   Result Value Ref Range    Preg Test, Ur Negative NEG         Other Studies:  US ABDOMEN LIMITED Specify organ? GALLBLADDER    Result Date: 6/8/2022  Right upper quadrant ultrasound HISTORY: Nausea and vomiting, history of gallstones Real-time sonography of the right upper quadrant was performed. Comparison: None FINDINGS: There is homogeneous echotexture throughout the liver. There is mild intrahepatic biliary ductal dilatation. There is no focal hepatic mass. Gallstones are present within the gallbladder lumen. There is gallbladder wall thickening measuring 5 mm. There is no pericholecystic fluid. There was a reported negative sonographic Jackson's sign. The common bile duct is dilated measuring 14 mm. The visualized portions of the pancreas were unremarkable however the tail was obscured by overlying bowel gas. The right kidney is unremarkable without hydronephrosis or solid mass. The right kidney was measured at approximately 11.1 cm. The visualized portions of the abdominal aorta and inferior vena cava are unremarkable. There is normal hepatopetal flow within the main portal vein. No free fluid is seen within the right upper quadrant.      1. Cholelithiasis with mild gallbladder wall thickening raising the possibility of acute or chronic cholecystitis. 2. Dilated common bile duct as well as intrahepatic biliary ductal dilatation. A distal stone or stricture could account for this appearance. An MRCP or ERCP may be beneficial there is further clinical concern. Echocardiogram:  No results found for this or any previous visit. Meds previously ordered:  Orders Placed This Encounter   Medications    0.9 % sodium chloride bolus    ondansetron (ZOFRAN) injection 4 mg    ketorolac (TORADOL) injection 15 mg    DISCONTD: piperacillin-tazobactam (ZOSYN) 3,375 mg in sodium chloride 0.9 % 50 mL IVPB (mini-bag)     Order Specific Question:   Antimicrobial Indications     Answer:   Intra-Abdominal Infection    FOLLOWED BY Linked Order Group     piperacillin-tazobactam (ZOSYN) 4,500 mg in sodium chloride 0.9 % 100 mL IVPB (mini-bag)      Order Specific Question:   Antimicrobial Indications      Answer:   Intra-Abdominal Infection     piperacillin-tazobactam (ZOSYN) 3,375 mg in sodium chloride 0.9 % 50 mL IVPB (mini-bag)      Order Specific Question:   Antimicrobial Indications      Answer:   Intra-Abdominal Infection         Part of this note may have been written by using a voice dictation software. The note has been proof read but may still contain some grammatical/other typographical errors.

## 2022-06-08 NOTE — ANESTHESIA PRE PROCEDURE
Department of Anesthesiology  Preprocedure Note       Name:  Cal Chowdhury   Age:  23 y.o.  :  2000                                          MRN:  [id number]         Date:  2022      Surgeon: Mariely Beckett):  JEMMA Hope MD    Procedure: Procedure(s):  ERCP ENDOSCOPIC RETROGRADE CHOLANGIOPANCREATOGRAPHY  ER 1    Medications prior to admission:   Prior to Admission medications    Not on File       Current medications:    Current Facility-Administered Medications   Medication Dose Route Frequency Provider Last Rate Last Admin    piperacillin-tazobactam (ZOSYN) 3,375 mg in sodium chloride 0.9 % 50 mL IVPB (mini-bag)  3,375 mg IntraVENous Q8H Tanner Staff, PA           Allergies:  No Known Allergies    Problem List:    Patient Active Problem List   Diagnosis Code    Choledocholithiasis with acute cholecystitis K80.42    Hyponatremia E87.1    Obesity (BMI 30-39. 9) E66.9       Past Medical History:  History reviewed. No pertinent past medical history. Past Surgical History:  History reviewed. No pertinent surgical history. Social History:    Social History     Tobacco Use    Smoking status: Never Smoker    Smokeless tobacco: Never Used   Substance Use Topics    Alcohol use: Not on file                                Counseling given: Not Answered      Vital Signs (Current):   Vitals:    22 1234 22 1249 22 1300 22 1319   BP: 116/81 125/82 120/72 127/78   Pulse:       Resp:       Temp:       TempSrc:       SpO2: 97% 96% 96% 98%   Weight:       Height:                                                  BP Readings from Last 3 Encounters:   22 127/78       NPO Status:                                                                                 BMI:   Wt Readings from Last 3 Encounters:   22 193 lb (87.5 kg)     Body mass index is 32.12 kg/m².     CBC:   Lab Results   Component Value Date    WBC 23.6 2022    RBC 4.94 2022    HGB 14.3 06/08/2022    HCT 42.5 06/08/2022    MCV 86.0 06/08/2022    RDW 12.9 06/08/2022     06/08/2022       CMP:   Lab Results   Component Value Date     06/08/2022    K 3.8 06/08/2022     06/08/2022    CO2 27 06/08/2022    BUN 8 06/08/2022    CREATININE 0.60 06/08/2022    GFRAA >60 06/08/2022    LABGLOM >60 06/08/2022    GLUCOSE 107 06/08/2022    PROT 7.8 06/08/2022    CALCIUM 9.3 06/08/2022    BILITOT 0.9 06/08/2022    ALKPHOS 166 06/08/2022    AST 87 06/08/2022     06/08/2022       POC Tests: No results for input(s): POCGLU, POCNA, POCK, POCCL, POCBUN, POCHEMO, POCHCT in the last 72 hours. Coags: No results found for: PROTIME, INR, APTT    HCG (If Applicable):   Lab Results   Component Value Date    PREGTESTUR Negative 06/08/2022        ABGs: No results found for: PHART, PO2ART, UNH5BZC, UTM3RQR, BEART, W2ONBVIN     Type & Screen (If Applicable):  No results found for: LABABO, LABRH    Drug/Infectious Status (If Applicable):  No results found for: HIV, HEPCAB    COVID-19 Screening (If Applicable): No results found for: COVID19        Anesthesia Evaluation  Patient summary reviewed and Nursing notes reviewed no history of anesthetic complications:   Airway: Mallampati: II  TM distance: >3 FB   Neck ROM: full  Mouth opening: > = 3 FB   Dental:          Pulmonary:Negative Pulmonary ROS breath sounds clear to auscultation                             Cardiovascular:  Exercise tolerance: good (>4 METS),           Rhythm: regular  Rate: normal                    Neuro/Psych:   Negative Neuro/Psych ROS              GI/Hepatic/Renal:            ROS comment: Gallstone pancreatitis. Endo/Other: Negative Endo/Other ROS                    Abdominal:             Vascular: negative vascular ROS. Other Findings:           Anesthesia Plan      general     ASA 3             Anesthetic plan and risks discussed with patient.                         Suhas Galan MD   6/8/2022

## 2022-06-08 NOTE — ED NOTES
TRANSFER - OUT REPORT:    Verbal report given to MEJIA Gannon on [employer]  being transferred to  [phone] for routine progression of patient care       Report consisted of patient's Situation, Background, Assessment and   Recommendations(SBAR). Information from the following report(s) ED SBAR was reviewed with the receiving nurse. Lines:   Peripheral IV 06/08/22 Right Antecubital (Active)        Opportunity for questions and clarification was provided.       Patient transported with:  Darrell Michael, [facility]  06/08/22 3334

## 2022-06-08 NOTE — ED PROVIDER NOTES
[employer] Emergency Department Provider Note                   PCP:                No primary care provider on file. Age: 23 y.o. Sex: female     No diagnosis found. DISPOSITION         New Prescriptions    No medications on file       Orders Placed This Encounter   Procedures    Culture, Blood 1    Blood Culture 2    US ABDOMEN LIMITED Specify organ? GALLBLADDER    CBC with Diff    CMP    Lipase    Lactic Acid    Diet NPO    POCT Urine Dipstick    Verify informed consent    POC Pregnancy Urine Qual    POCT Urinalysis no Micro    POC Pregnancy Urine Qual        MDM  Number of Diagnoses or Management Options  Acute pancreatitis, unspecified complication status, unspecified pancreatitis type  Choledocholithiasis  Diagnosis management comments: Patient is a 82-year-old female who has history of gallstones presenting with 3 days of worsening abdominal pain nausea and vomiting feeling distended and bloated in the upper abdomen. She has not been able to keep anything down for the last 2 days and came in with worsening symptoms. She rates her current pain as a 5 out of 10. She is afebrile, vital signs stable, she does appear to have tenderness throughout the upper abdomen but worse in the epigastric region. Will obtain labs including CBC, CMP, lipase, UA and will obtain ultrasound of the abdomen to evaluate for gallstones.     Labs Reviewed  CBC WITH AUTO DIFFERENTIAL - Abnormal; Notable for the following components:     WBC                           23.6 (*)               Seg Neutrophils               90 (*)                 Lymphocytes                   4 (*)                  Eosinophils %                 0 (*)                  Segs Absolute                 21.2 (*)            All other components within normal limits  COMPREHENSIVE METABOLIC PANEL - Abnormal; Notable for the following components:     Sodium                        134 (*)                Anion Gap 6 (*)                  Glucose                       107 (*)                ALT                           383 (*)                AST                           87 (*)                 Alk Phosphatase               166 (*)                Globulin                      3.7 (*)                Albumin/Globulin Ratio        1.1 (*)             All other components within normal limits  LIPASE - Abnormal; Notable for the following components:     Lipase                        6,486 (*)            All other components within normal limits  POCT URINALYSIS DIPSTICK - Abnormal; Notable for the following components:     Specific Gravity, Urine, POC   1.025 (*)               Protein, Urine, POC           TRACE (*)               KETONES, Urine, POC           >160 (*)               Bilirubin, Urine, POC         SMALL (*)               Blood, UA POC                   (*)               All other components within normal limits  CULTURE, BLOOD 1  CULTURE, BLOOD 1  LACTIC ACID  POC PREGNANCY UR-QUAL  POC PREGNANCY UR-QUAL    Labs show elevation of white count with left shift of 23, elevated LFTs and lipase of 6004 100. Ultrasound shows:    1. Cholelithiasis with mild gallbladder wall thickening raising the possibility   of acute or chronic cholecystitis. 2. Dilated common bile duct as well as intrahepatic biliary ductal dilatation. A   distal stone or stricture could account for this appearance. An MRCP or ERCP may   be beneficial there is further clinical concern. Due to elevated white count and concern for intra-abdominal infection, will obtain lactic acid and blood cultures. Dose of IV Zosyn ordered. Did contact GI, Dr. Char Burris, who will evaluate the patient. Will contact Dr. Stephanie Mason dispatcher, hospitalist medicine for admission.          Ceci Diamond is a 23 y.o. female who presents to the Emergency Department with chief complaint of    Chief Complaint   Patient presents with    Abdominal Pain      Patient is a 82-year-old female who presents with complaint of upper abdominal pain nausea and vomiting x3 days. She states that she has history of gallstones that was diagnosed 3 years ago and at the time she was too afraid to have surgery and thus has been trying homeopathic remedies for her stones. For the last 3 days she has had persistent nausea and vomiting unable to keep anything even small sips of water down. She also is feeling \"gassy\" and bloated in the upper abdominal area. She states she has pain throughout the entire upper abdomen that wraps around to her back. She denies any fevers but does admit to having chills. She rates her pain as a 5 out of 10. She notes that previously when she would have \"gallbladder attacks\". She would get sharp pain in the right upper quadrant. She states her symptoms have never been as severe as they have been over the past few days. She denies any urinary symptoms. She denies any previous abdominal surgeries. The history is provided by the patient. All other systems reviewed and are negative. Review of Systems   Constitutional: Positive for chills. Negative for activity change, appetite change, fatigue and fever. HENT: Negative for congestion, ear pain, rhinorrhea and sore throat. Eyes: Negative for redness. Respiratory: Negative for cough, chest tightness and shortness of breath. Cardiovascular: Negative for chest pain. Gastrointestinal: Positive for abdominal pain, nausea and vomiting. Negative for abdominal distention and diarrhea. Musculoskeletal: Negative for back pain and neck pain. Skin: Negative for rash. Neurological: Negative for light-headedness and headaches. Psychiatric/Behavioral: Negative for confusion. No past medical history on file. No past surgical history on file. No family history on file.         Social Connections:     Frequency of Communication with Friends and Family: Not on file    Frequency of Social Gatherings with Friends and Family: Not on file    Attends Religion Services: Not on file    Active Member of Clubs or Organizations: Not on file    Attends Club or Organization Meetings: Not on file    Marital Status: Not on file        No Known Allergies     Vitals signs and nursing note reviewed. Patient Vitals for the past 4 hrs:   Temp Pulse Resp BP SpO2   06/08/22 0931 98.4 °F (36.9 °C) 100 18 (!) 135/97 99 %          Physical Exam  Vitals and nursing note reviewed. Constitutional:       General: She is not in acute distress. Appearance: She is not toxic-appearing. HENT:      Head: Normocephalic and atraumatic. Mouth/Throat:      Comments: Mucus membranes dry  Cardiovascular:      Rate and Rhythm: Normal rate. Heart sounds: Normal heart sounds. Pulmonary:      Effort: Pulmonary effort is normal.      Breath sounds: Normal breath sounds. Abdominal:      Tenderness: There is abdominal tenderness in the right upper quadrant, epigastric area and left upper quadrant. There is no guarding or rebound. Skin:     General: Skin is warm and dry. Neurological:      Mental Status: She is alert and oriented to person, place, and time.    Psychiatric:         Mood and Affect: Mood normal.         Behavior: Behavior normal.          Procedures    Labs Reviewed   CBC WITH AUTO DIFFERENTIAL - Abnormal; Notable for the following components:       Result Value    WBC 23.6 (*)     Seg Neutrophils 90 (*)     Lymphocytes 4 (*)     Eosinophils % 0 (*)     Segs Absolute 21.2 (*)     All other components within normal limits   COMPREHENSIVE METABOLIC PANEL - Abnormal; Notable for the following components:    Sodium 134 (*)     Anion Gap 6 (*)     Glucose 107 (*)      (*)     AST 87 (*)     Alk Phosphatase 166 (*)     Globulin 3.7 (*)     Albumin/Globulin Ratio 1.1 (*)     All other components within normal limits   LIPASE - Abnormal; Notable for the following components:    Lipase 6,486 (*) All other components within normal limits   POCT URINALYSIS DIPSTICK - Abnormal; Notable for the following components:    Specific Gravity, Urine, POC 1.025 (*)     Protein, Urine, POC TRACE (*)     KETONES, Urine, POC >160 (*)     Bilirubin, Urine, POC SMALL (*)     Blood, UA POC Trace Intact (*)     All other components within normal limits   CULTURE, BLOOD 1   CULTURE, BLOOD 1   LACTIC ACID   POC PREGNANCY UR-QUAL   POC PREGNANCY UR-QUAL        US ABDOMEN LIMITED Specify organ? GALLBLADDER   Final Result   1. Cholelithiasis with mild gallbladder wall thickening raising the possibility   of acute or chronic cholecystitis. 2. Dilated common bile duct as well as intrahepatic biliary ductal dilatation. A   distal stone or stricture could account for this appearance. An MRCP or ERCP may   be beneficial there is further clinical concern. Voice dictation software was used during the making of this note. This software is not perfect and grammatical and other typographical errors may be present. This note has not been completely proofread for errors.        Zaina [place], [place]  06/08/22 1478

## 2022-06-08 NOTE — PROGRESS NOTES
TRANSFER - IN REPORT:    Verbal report received from [address] [facility] on [employer]  being received from [facility] for ordered procedure      Report consisted of patient's Situation, Background, Assessment and   Recommendations(SBAR). Information from the following report(s) Nurse Handoff Report was reviewed with the receiving nurse. Opportunity for questions and clarification was provided. Assessment completed upon patient's arrival to unit and care assumed.

## 2022-06-08 NOTE — PERIOP NOTE
TRANSFER - OUT REPORT:    Verbal report given to Teressa BA on [employer]  being transferred to [facility] for routine progression of patient care       Report consisted of patients Situation, Background, Assessment and   Recommendations(SBAR). Information from the following report(s) Nurse Handoff Report, ED Encounter Summary, Adult Overview, Surgery Report, Intake/Output, MAR and Cardiac Rhythm NSR was reviewed with the receiving nurse. Lines:   Peripheral IV 06/08/22 Right Antecubital (Active)   Site Assessment Clean, dry & intact 06/08/22 1657   Line Status Infusing;Flushed 06/08/22 1657   Line Care Connections checked and tightened 06/08/22 1657   Phlebitis Assessment No symptoms 06/08/22 1657   Infiltration Assessment 0 06/08/22 1657   Dressing Status Clean, dry & intact 06/08/22 1657   Dressing Type Transparent 06/08/22 1657        Opportunity for questions and clarification was provided. Patient transported with:   Tech    VTE prophylaxis orders have been written for [employer]. Patient and family given floor number and nurses name. Family updated re: pt status after security code verified.

## 2022-06-08 NOTE — OP NOTE
NDOSCOPIC  RETROGRADE CHOLANGIOPANCREATOGRAPHY    DATE of PROCEDURE: 6/8/2022    PT NAME: Jodie Chairez     [id number]    MEDICATION: general    INSTRUMENT:  [id number]    SPECIAL PROCEDURE:papillotomy w/ balloon sweep- 12/15 mm  BLOOD LOSS- 0 to min. SPEC- no  IMPLANT- none    PROCEDURE: After informed consent, the patient was placed under anesthesia in the semi-prone position. The duodenoscope was passed without difficulty to the area of the ampulla. A standard cannulation and ERCP was performed with the findings as outlined and described below. Patient tolerated the procedure well. ASSESSMENT:  1. Tight orifice required double guide wire; CBD about 15 mm; minimal sludge extracted- able to pass 15 mm balloon; likely passed a stone  2. Pancreas normal   3.  GB - not seen    PLAN:  1. inpt f/u    Beth Null MD

## 2022-06-08 NOTE — H&P (VIEW-ONLY)
[facility] Consult Note       Primary GI Physician: Jesus Perez MD (new)  Referring Provider: Laura Dee PA-C    Consult Date:  6/8/2022  Admit Date:  6/8/2022    Chief Complaint:  Gallstone pancreatitis    Subjective:     History of Present Illness:  Patient is a 23 y.o. female with known gallstones, but otherwise no other significant PMHx who is seen in consultation at the request of Laura Dee PA-C for further evaluation of gallstone pancreatitis. Patient presented to the ED today with a 2 day history of moderate to severe, constant upper abdominal pressure pains. + N/V, food stuff emesis. No CG emesis, hematemesis, or melena. No F/C. Patient has had similar abdominal pains in the past. She reports having seen a surgeon 4 years ago for same. Surgeon recommended CCY, but patient elected to defer surgery due to lack of insurance. She has been seeing a Chiropractor who has treated her gallstones with bile salts and a medication to keep her bile ducts dilated. She denies tobacco, NSAIDs, or alcohol use. Her pain has improved in the ER after getting Toradol. Labs in the ED revealed , , AST 87, bili 0.9, lipase of 6486, and WBC of 23k. She denies prior hx of pancreatitis. RUQ abdominal US showed cholelithiasis with mild GB wall thickening and dilated CBD and intrahepatic ducts. She has multiple family members with GB disease especially on her mother's side. PMH:  Gallstones    PSH:  None.     Allergies:  No Known Allergies    Home Medications:  Prior to Admission medications    Not on [facility] Medications:  Current Facility-Administered Medications   Medication Dose Route Frequency    piperacillin-tazobactam (ZOSYN) 3,375 mg in sodium chloride 0.9 % 50 mL IVPB (mini-bag)  3,375 mg IntraVENous Once    Followed by    piperacillin-tazobactam (ZOSYN) 3,375 mg in sodium chloride 0.9 % 50 mL IVPB (mini-bag)  3,375 mg IntraVENous Q8H     No current outpatient medications on file. Social History:  Social History     Tobacco Use    Smoking status: Not on file    Smokeless tobacco: Not on file   Substance Use Topics    Alcohol use: Not on file       Pt denies any history of drug use, blood transfusions, or tattoos. Family History:  Multiple family members with GB disease. Review of Systems:  A detailed 10 system ROS is obtained, with pertinent positives as listed above. All others are negative. Diet:  NPO    Objective:     Physical Exam:  Vitals:  BP (!) 135/97   Pulse 100   Temp 98.4 °F (36.9 °C) (Oral)   Resp 18   Ht 5' 5\" (1.651 m)   Wt 193 lb (87.5 kg)   SpO2 99%   BMI 32.12 kg/m²   Gen:  Pt is alert, cooperative, NAD. Skin:  Extremities and face reveal no rashes. HEENT: Sclerae anicteric. Extra-occular muscles are intact. No oral ulcers. No abnormal pigmentation of the lips. The neck is supple. Cardiovascular: SINUS TACHY. No murmurs, gallops, or rubs. Respiratory:  Comfortable breathing with no accessory muscle use. Clear breath sounds anteriorly with no wheezes, rales, or rhonchi. GI:  Abdomen nondistended, soft, and MILDLY TTP EPIGASTRIC without G/R.  NABS. No enlargement of the liver or spleen. No masses palpable. Rectal:  Deferred  Musculoskeletal:  No pitting edema of the lower legs. Neurological:  Gross memory appears intact. Patient is alert and oriented. Psychiatric:  Mood appears appropriate with judgement intact. Lymphatic:  No cervical or supraclavicular adenopathy. Laboratory:    Recent Labs     06/08/22  0938   WBC 23.6*   HGB 14.3   HCT 42.5      MCV 86.0   *   K 3.8      CO2 27   BUN 8   AST 87*   *          Assessment:     24 y/o F with known gallstones who presents with recent onset of upper abdominal pains with N/V. Work-up in the ER revealed , AST 87, , and normal T bili. Lipase elevated in the 6000s. WBC 23k.  US revealed dilated CBD and intrahepatics raising the suspicion for CBD stone / gallstone pancreatitis, gallstones +/- acute or chronic cholecystitis. She saw a surgeon several years ago for similar abdominal pains and at that time deferred CCY due to lack of insurance. Plan:     ERCP later today to evaluate biliary tree and to remove gallstones, sludge, debris etc. The risks of procedure were discussed with patient including anesthesia, bleeding, infection, perforation, or worsening pancreatitis. Patient verbalizes understanding and wishes to pursue ERCP. Hospitalist to admit. Agree with surgical consult. Anti-emetics and pain mgt per primary. Further recs pending ERCP results. Patient is seen and examined in collaboration with Dr. Cinthia Traore. Assessment and plan as per Dr. Jalen Roman.       Kaylee Mckeon PA-C  [facility]

## 2022-06-08 NOTE — FLOWSHEET NOTE
06/08/22 1744   Dual Clinician Skin Assessment   Dual Skin Assessment (4 Eyes) WDL   Second Clinical  (First and Last Name) Benton Lombard, RN   pt has no noted skin breakdown on sacrum or heels. Skin is intact. Yes

## 2022-06-09 ENCOUNTER — ANESTHESIA EVENT (OUTPATIENT)
Dept: SURGERY | Age: 22
DRG: 417 | End: 2022-06-09

## 2022-06-09 PROBLEM — R79.89 ELEVATED LFTS: Status: ACTIVE | Noted: 2022-06-09

## 2022-06-09 PROBLEM — K85.10 GALLSTONE PANCREATITIS: Status: ACTIVE | Noted: 2022-06-09

## 2022-06-09 PROBLEM — D72.829 LEUKOCYTOSIS: Status: ACTIVE | Noted: 2022-06-09

## 2022-06-09 PROBLEM — K83.8 DILATED CBD, ACQUIRED: Status: ACTIVE | Noted: 2022-06-09

## 2022-06-09 LAB
ALBUMIN SERPL-MCNC: 3.1 G/DL (ref 3.5–5)
ALBUMIN/GLOB SERPL: 0.9 {RATIO} (ref 1.2–3.5)
ALP SERPL-CCNC: 125 U/L (ref 50–136)
ALT SERPL-CCNC: 212 U/L (ref 12–65)
ANION GAP SERPL CALC-SCNC: 11 MMOL/L (ref 7–16)
AST SERPL-CCNC: 38 U/L (ref 15–37)
BASOPHILS # BLD: 0 K/UL (ref 0–0.2)
BASOPHILS NFR BLD: 0 % (ref 0–2)
BILIRUB SERPL-MCNC: 0.6 MG/DL (ref 0.2–1.1)
BUN SERPL-MCNC: 10 MG/DL (ref 6–23)
CALCIUM SERPL-MCNC: 8.5 MG/DL (ref 8.3–10.4)
CHLORIDE SERPL-SCNC: 107 MMOL/L (ref 98–107)
CO2 SERPL-SCNC: 22 MMOL/L (ref 21–32)
CREAT SERPL-MCNC: 0.5 MG/DL (ref 0.6–1)
DIFFERENTIAL METHOD BLD: ABNORMAL
EOSINOPHIL # BLD: 0 K/UL (ref 0–0.8)
EOSINOPHIL NFR BLD: 0 % (ref 0.5–7.8)
ERYTHROCYTE [DISTWIDTH] IN BLOOD BY AUTOMATED COUNT: 13.1 % (ref 11.9–14.6)
GLOBULIN SER CALC-MCNC: 3.4 G/DL (ref 2.3–3.5)
GLUCOSE SERPL-MCNC: 75 MG/DL (ref 65–100)
HCT VFR BLD AUTO: 35.3 % (ref 35.8–46.3)
HGB BLD-MCNC: 11.6 G/DL (ref 11.7–15.4)
IMM GRANULOCYTES # BLD AUTO: 0.1 K/UL (ref 0–0.5)
IMM GRANULOCYTES NFR BLD AUTO: 1 % (ref 0–5)
LIPASE SERPL-CCNC: 1945 U/L (ref 73–393)
LYMPHOCYTES # BLD: 0.9 K/UL (ref 0.5–4.6)
LYMPHOCYTES NFR BLD: 5 % (ref 13–44)
MCH RBC QN AUTO: 29.4 PG (ref 26.1–32.9)
MCHC RBC AUTO-ENTMCNC: 32.9 G/DL (ref 31.4–35)
MCV RBC AUTO: 89.4 FL (ref 79.6–97.8)
MONOCYTES # BLD: 1.1 K/UL (ref 0.1–1.3)
MONOCYTES NFR BLD: 6 % (ref 4–12)
NEUTS SEG # BLD: 16.8 K/UL (ref 1.7–8.2)
NEUTS SEG NFR BLD: 89 % (ref 43–78)
NRBC # BLD: 0 K/UL (ref 0–0.2)
PLATELET # BLD AUTO: 191 K/UL (ref 150–450)
PMV BLD AUTO: 12.1 FL (ref 9.4–12.3)
POTASSIUM SERPL-SCNC: 3.6 MMOL/L (ref 3.5–5.1)
PROT SERPL-MCNC: 6.5 G/DL (ref 6.3–8.2)
RBC # BLD AUTO: 3.95 M/UL (ref 4.05–5.2)
SODIUM SERPL-SCNC: 140 MMOL/L (ref 136–145)
WBC # BLD AUTO: 18.9 K/UL (ref 4.3–11.1)

## 2022-06-09 PROCEDURE — 6360000002 HC RX W HCPCS: Performed by: INTERNAL MEDICINE

## 2022-06-09 PROCEDURE — 2580000003 HC RX 258: Performed by: INTERNAL MEDICINE

## 2022-06-09 PROCEDURE — 36415 COLL VENOUS BLD VENIPUNCTURE: CPT

## 2022-06-09 PROCEDURE — 1100000000 HC RM PRIVATE

## 2022-06-09 PROCEDURE — 2580000003 HC RX 258: Performed by: SURGERY

## 2022-06-09 PROCEDURE — 83690 ASSAY OF LIPASE: CPT

## 2022-06-09 PROCEDURE — 6370000000 HC RX 637 (ALT 250 FOR IP): Performed by: INTERNAL MEDICINE

## 2022-06-09 PROCEDURE — 2500000003 HC RX 250 WO HCPCS: Performed by: SURGERY

## 2022-06-09 PROCEDURE — 80053 COMPREHEN METABOLIC PANEL: CPT

## 2022-06-09 PROCEDURE — A4216 STERILE WATER/SALINE, 10 ML: HCPCS | Performed by: SURGERY

## 2022-06-09 PROCEDURE — 99254 IP/OBS CNSLTJ NEW/EST MOD 60: CPT | Performed by: SURGERY

## 2022-06-09 PROCEDURE — 85025 COMPLETE CBC W/AUTO DIFF WBC: CPT

## 2022-06-09 RX ORDER — DEXTROSE, SODIUM CHLORIDE, AND POTASSIUM CHLORIDE 5; .45; .15 G/100ML; G/100ML; G/100ML
INJECTION INTRAVENOUS CONTINUOUS
Status: DISCONTINUED | OUTPATIENT
Start: 2022-06-09 | End: 2022-06-10

## 2022-06-09 RX ADMIN — OXYCODONE 5 MG: 5 TABLET ORAL at 10:36

## 2022-06-09 RX ADMIN — SODIUM CHLORIDE, PRESERVATIVE FREE 10 ML: 5 INJECTION INTRAVENOUS at 20:25

## 2022-06-09 RX ADMIN — PIPERACILLIN AND TAZOBACTAM 3375 MG: 3; .375 INJECTION, POWDER, LYOPHILIZED, FOR SOLUTION INTRAVENOUS at 16:54

## 2022-06-09 RX ADMIN — DEXTROSE MONOHYDRATE, SODIUM CHLORIDE, AND POTASSIUM CHLORIDE: 50; 4.5; 1.49 INJECTION, SOLUTION INTRAVENOUS at 20:24

## 2022-06-09 RX ADMIN — SODIUM CHLORIDE, PRESERVATIVE FREE 20 MG: 5 INJECTION INTRAVENOUS at 20:25

## 2022-06-09 RX ADMIN — OXYCODONE 5 MG: 5 TABLET ORAL at 19:29

## 2022-06-09 RX ADMIN — DEXTROSE MONOHYDRATE, SODIUM CHLORIDE, AND POTASSIUM CHLORIDE: 50; 4.5; 1.49 INJECTION, SOLUTION INTRAVENOUS at 08:05

## 2022-06-09 RX ADMIN — PIPERACILLIN AND TAZOBACTAM 3375 MG: 3; .375 INJECTION, POWDER, LYOPHILIZED, FOR SOLUTION INTRAVENOUS at 10:35

## 2022-06-09 RX ADMIN — OXYCODONE 5 MG: 5 TABLET ORAL at 03:30

## 2022-06-09 RX ADMIN — ENOXAPARIN SODIUM 40 MG: 100 INJECTION SUBCUTANEOUS at 20:25

## 2022-06-09 RX ADMIN — SODIUM CHLORIDE, PRESERVATIVE FREE 20 MG: 5 INJECTION INTRAVENOUS at 11:58

## 2022-06-09 RX ADMIN — SODIUM CHLORIDE, PRESERVATIVE FREE 10 ML: 5 INJECTION INTRAVENOUS at 12:02

## 2022-06-09 RX ADMIN — PIPERACILLIN AND TAZOBACTAM 3375 MG: 3; .375 INJECTION, POWDER, LYOPHILIZED, FOR SOLUTION INTRAVENOUS at 01:33

## 2022-06-09 ASSESSMENT — PAIN DESCRIPTION - ORIENTATION
ORIENTATION: LOWER
ORIENTATION: RIGHT;MID

## 2022-06-09 ASSESSMENT — PAIN SCALES - GENERAL
PAINLEVEL_OUTOF10: 5
PAINLEVEL_OUTOF10: 0
PAINLEVEL_OUTOF10: 4
PAINLEVEL_OUTOF10: 1

## 2022-06-09 ASSESSMENT — PAIN DESCRIPTION - FREQUENCY: FREQUENCY: CONTINUOUS

## 2022-06-09 ASSESSMENT — PAIN DESCRIPTION - DESCRIPTORS
DESCRIPTORS: ACHING
DESCRIPTORS: ACHING

## 2022-06-09 ASSESSMENT — PAIN DESCRIPTION - PAIN TYPE: TYPE: ACUTE PAIN

## 2022-06-09 ASSESSMENT — PAIN - FUNCTIONAL ASSESSMENT: PAIN_FUNCTIONAL_ASSESSMENT: ACTIVITIES ARE NOT PREVENTED

## 2022-06-09 ASSESSMENT — PAIN DESCRIPTION - LOCATION
LOCATION: ABDOMEN
LOCATION: GENERALIZED;ABDOMEN
LOCATION: ABDOMEN

## 2022-06-09 ASSESSMENT — PAIN DESCRIPTION - ONSET: ONSET: ON-GOING

## 2022-06-09 NOTE — PROGRESS NOTES
Hospitalist Progress Note   Admit Date:  2022  9:40 AM   Name:  Abigail Kline   Age:  23 y.o. Sex:  female  :  2000   MRN:  [id number]   Room:      Presenting Complaint: Abdominal Pain     Reason(s) for Admission: Choledocholithiasis [K80.50]  Choledocholithiasis with acute cholecystitis [K80.42]  Acute pancreatitis, unspecified complication status, unspecified pancreatitis type [facility] Course & Interval History:     Patient with past medical history of    Gallstones   BMI of 32    Patient came to hospital due to nausea, emesis, abdominal pain. She was found to have elevated lipase, and liver enzymes. Abdominal US shows cholelithiasis with findings consistent with chronic cholecystitis, dilated common bile duct and intrahepatic biliary ductal dilatation. Patient was admitted and started on Empiric IV antibiotics and patient was seen by GI service. Patient had ERCP and papillotomy. General surgery service is also consulted. Impression is that patient has had dislodgement of gallstones and ? CBD stones. Patient felt better and LFT shows improvement with less bilirubin and liver enzymes levels. General surgery plans for cholecystectomy before hospital discharge. Subjective/24hr Events (22):     22   Patient has no fever. No chest pain. No shortness of breath. Less abdominal pain. Assessment & Plan:     Principal Problem:    Gallstone pancreatitis    Choledocholithiasis with acute cholecystitis    Leukocytosis    Elevated LFTs  Dilated cbd, acquired    Acute pancreatitis  Likely patient had dislodgement of stones. Will monitor. So far, no fever. Continue empiric Zosyn. Follow up on culture results. So far, culture results are negative. Continue IV fluid. Patient is allowed clear liquid diet. Active Problems:    Hyponatremia  Improved and resolved. Na is now 140      Obesity (BMI 30-39. 9)  Patient will be counseled for weight loss and maintenance of healthy weight when recovering from this acute illness. I have discussed the plan of care with patient. Discharge Planning:    Home when ready     Diet:  ADULT DIET; Clear Liquid  DVT PPx: Enoxaparin SC   Code status: Full Code    Hospital Problems:  Principal Problem:    Gallstone pancreatitis  Active Problems:    Choledocholithiasis with acute cholecystitis    Hyponatremia    Obesity (BMI 30-39. 9)    Leukocytosis    Elevated LFTs    Dilated cbd, acquired    Acute pancreatitis  Resolved Problems:    * No resolved hospital problems. *      Objective:     Patient Vitals for the past 24 hrs:   Temp Pulse Resp BP SpO2   06/09/22 1140 98.8 °F (37.1 °C) 71 16 130/82 100 %   06/09/22 0739 98.6 °F (37 °C) 67 16 126/69 99 %   06/09/22 0428 98.3 °F (36.8 °C) 63 16 120/67 97 %   06/08/22 2348 98.6 °F (37 °C) 67 16 122/85 96 %   06/08/22 1920 98.6 °F (37 °C) 70 16 114/65 97 %   06/08/22 1749 98.6 °F (37 °C) 74 16 122/73 98 %   06/08/22 1735 98 °F (36.7 °C) 76 16 121/67 97 %   06/08/22 1730  63 15 125/69 100 %   06/08/22 1725  64 16 128/73 100 %   06/08/22 1720  62 16 130/69 100 %   06/08/22 1715  71 16 124/67 100 %   06/08/22 1710  65 15 126/74 100 %   06/08/22 1705  67 14 124/69 100 %   06/08/22 1700  69 16 125/67 100 %   06/08/22 1654  72 16 121/68 100 %   06/08/22 1649 98.7 °F (37.1 °C) 80 14 125/65 100 %   06/08/22 1404 98 °F (36.7 °C) 85 16 129/73 98 %   06/08/22 1319    127/78 98 %   06/08/22 1300    120/72 96 %       Oxygen Therapy  SpO2: 100 %  Pulse Oximetry Type: Continuous  Pulse Oximeter Device Mode: Continuous  Pulse Oximeter Device Location: Right  O2 Device: None (Room air)  O2 Flow Rate (L/min): 3 L/min    Estimated body mass index is 32.12 kg/m² as calculated from the following:    Height as of this encounter: 5' 5\" (1.651 m). Weight as of this encounter: 193 lb (87.5 kg).     Intake/Output Summary (Last 24 hours) at 6/9/2022 1258  Last data filed at 6/9/2022 0144  Gross per 24 hour   Intake 1561 ml   Output 0 ml   Net 1561 ml         Physical Exam:     Blood pressure 130/82, pulse 71, temperature 98.8 °F (37.1 °C), temperature source Oral, resp. rate 16, height 5' 5\" (1.651 m), weight 193 lb (87.5 kg), SpO2 100 %. General:    Well nourished. Patient is lying in bed and appears comfortable. Head:  Normocephalic, atraumatic, mildly pale and no icterus   Eyes:  Sclerae appear normal.  Pupils equally round. ENT:  Nares appear normal, no drainage. Moist oral mucosa  Neck:  No restricted ROM. Trachea midline   CV:   RRR. No m/r/g. No jugular venous distension. Lungs:   CTAB. No wheezing, rhonchi, or rales. Respirations even, unlabored  Abdomen: Bowel sounds present. Soft, nontender, nondistended. Extremities: No cyanosis or clubbing. No edema  Skin:     No rashes and normal coloration. Warm and dry. Neuro:  CN II-XII grossly intact. Sensation intact. A&Ox3  Psych:  Normal mood and affect.       I have reviewed ordered lab tests and independently visualized imaging below:    Recent Labs:  Recent Results (from the past 48 hour(s))   CBC with Diff    Collection Time: 06/08/22  9:38 AM   Result Value Ref Range    WBC 23.6 (H) 4.3 - 11.1 K/uL    RBC 4.94 4.05 - 5.2 M/uL    Hemoglobin 14.3 11.7 - 15.4 g/dL    Hematocrit 42.5 35.8 - 46.3 %    MCV 86.0 79.6 - 97.8 FL    MCH 28.9 26.1 - 32.9 PG    MCHC 33.6 31.4 - 35.0 g/dL    RDW 12.9 11.9 - 14.6 %    Platelets 975 736 - 611 K/uL    MPV 11.6 9.4 - 12.3 FL    nRBC 0.00 0.0 - 0.2 K/uL    Differential Type AUTOMATED      Seg Neutrophils 90 (H) 43 - 78 %    Lymphocytes 4 (L) 13 - 44 %    Monocytes 5 4.0 - 12.0 %    Eosinophils % 0 (L) 0.5 - 7.8 %    Basophils 0 0.0 - 2.0 %    Immature Granulocytes 1 0.0 - 5.0 %    Segs Absolute 21.2 (H) 1.7 - 8.2 K/UL    Absolute Lymph # 1.0 0.5 - 4.6 K/UL    Absolute Mono # 1.3 0.1 - 1.3 K/UL    Absolute Eos # 0.0 0.0 - 0.8 K/UL    Basophils Absolute 0.0 0.0 - 0.2 K/UL Absolute Immature Granulocyte 0.1 0.0 - 0.5 K/UL   CMP    Collection Time: 06/08/22  9:38 AM   Result Value Ref Range    Sodium 134 (L) 136 - 145 mmol/L    Potassium 3.8 3.5 - 5.1 mmol/L    Chloride 101 98 - 107 mmol/L    CO2 27 21 - 32 mmol/L    Anion Gap 6 (L) 7 - 16 mmol/L    Glucose 107 (H) 65 - 100 mg/dL    BUN 8 6 - 23 MG/DL    CREATININE 0.60 0.6 - 1.0 MG/DL    GFR African American >60 >60 ml/min/1.73m2    GFR Non- >60 >60 ml/min/1.73m2    Calcium 9.3 8.3 - 10.4 MG/DL    Total Bilirubin 0.9 0.2 - 1.1 MG/DL     (H) 12 - 65 U/L    AST 87 (H) 15 - 37 U/L    Alk Phosphatase 166 (H) 50 - 136 U/L    Total Protein 7.8 6.3 - 8.2 g/dL    Albumin 4.1 3.5 - 5.0 g/dL    Globulin 3.7 (H) 2.3 - 3.5 g/dL    Albumin/Globulin Ratio 1.1 (L) 1.2 - 3.5     Lipase    Collection Time: 06/08/22  9:38 AM   Result Value Ref Range    Lipase 6,486 (H) 73 - 393 U/L   Lactic Acid    Collection Time: 06/08/22 10:51 AM   Result Value Ref Range    Lactic Acid, Plasma 1.2 0.4 - 2.0 MMOL/L   Culture, Blood 1    Collection Time: 06/08/22 10:51 AM    Specimen: Blood   Result Value Ref Range    Special Requests RIGHT  FOREARM        Culture NO GROWTH AFTER 19 HOURS     POCT Urinalysis no Micro    Collection Time: 06/08/22 10:57 AM   Result Value Ref Range    Specific Gravity, Urine, POC 1.025 (H) 1.001 - 1.023      pH, Urine, POC 6.0 5.0 - 9.0      Protein, Urine, POC TRACE (A) NEG mg/dL    Glucose, UA POC Negative NEG mg/dL    KETONES, Urine, POC >160 (A) NEG mg/dL    Bilirubin, Urine, POC SMALL (A) NEG      Blood, UA POC Trace Intact (A) NEG      URINE UROBILINOGEN POC 0.2 0.2 - 1.0 EU/dL    Nitrate, Urine, POC Negative NEG      Leukocyte Est, UA POC Negative NEG      Performed by: Jordan Rodarte    POC Pregnancy Urine Qual    Collection Time: 06/08/22 10:58 AM   Result Value Ref Range    Preg Test, Ur Negative NEG     Blood Culture 2    Collection Time: 06/08/22  7:12 PM    Specimen: Blood   Result Value Ref Range Special Requests RIGHT  HAND        Culture NO GROWTH AFTER 10 HOURS     Comprehensive Metabolic Panel w/ Reflex to MG    Collection Time: 06/09/22  3:27 AM   Result Value Ref Range    Sodium 140 136 - 145 mmol/L    Potassium 3.6 3.5 - 5.1 mmol/L    Chloride 107 98 - 107 mmol/L    CO2 22 21 - 32 mmol/L    Anion Gap 11 7 - 16 mmol/L    Glucose 75 65 - 100 mg/dL    BUN 10 6 - 23 MG/DL    CREATININE 0.50 (L) 0.6 - 1.0 MG/DL    GFR African American >60 >60 ml/min/1.73m2    GFR Non- >60 >60 ml/min/1.73m2    Calcium 8.5 8.3 - 10.4 MG/DL    Total Bilirubin 0.6 0.2 - 1.1 MG/DL     (H) 12 - 65 U/L    AST 38 (H) 15 - 37 U/L    Alk Phosphatase 125 50 - 136 U/L    Total Protein 6.5 6.3 - 8.2 g/dL    Albumin 3.1 (L) 3.5 - 5.0 g/dL    Globulin 3.4 2.3 - 3.5 g/dL    Albumin/Globulin Ratio 0.9 (L) 1.2 - 3.5     CBC with Auto Differential    Collection Time: 06/09/22  3:27 AM   Result Value Ref Range    WBC 18.9 (H) 4.3 - 11.1 K/uL    RBC 3.95 (L) 4.05 - 5.2 M/uL    Hemoglobin 11.6 (L) 11.7 - 15.4 g/dL    Hematocrit 35.3 (L) 35.8 - 46.3 %    MCV 89.4 79.6 - 97.8 FL    MCH 29.4 26.1 - 32.9 PG    MCHC 32.9 31.4 - 35.0 g/dL    RDW 13.1 11.9 - 14.6 %    Platelets 741 551 - 507 K/uL    MPV 12.1 9.4 - 12.3 FL    nRBC 0.00 0.0 - 0.2 K/uL    Differential Type AUTOMATED      Seg Neutrophils 89 (H) 43 - 78 %    Lymphocytes 5 (L) 13 - 44 %    Monocytes 6 4.0 - 12.0 %    Eosinophils % 0 (L) 0.5 - 7.8 %    Basophils 0 0.0 - 2.0 %    Immature Granulocytes 1 0.0 - 5.0 %    Segs Absolute 16.8 (H) 1.7 - 8.2 K/UL    Absolute Lymph # 0.9 0.5 - 4.6 K/UL    Absolute Mono # 1.1 0.1 - 1.3 K/UL    Absolute Eos # 0.0 0.0 - 0.8 K/UL    Basophils Absolute 0.0 0.0 - 0.2 K/UL    Absolute Immature Granulocyte 0.1 0.0 - 0.5 K/UL   Lipase    Collection Time: 06/09/22  3:27 AM   Result Value Ref Range    Lipase 1,945 (H) 73 - 393 U/L       Other Studies:  FL ERCP BILIARY AND PANCREATIC S&I   Final Result   Status post ERCP without residual filling defect status post balloon   sweep. US ABDOMEN LIMITED Specify organ? GALLBLADDER   Final Result   1. Cholelithiasis with mild gallbladder wall thickening raising the possibility   of acute or chronic cholecystitis. 2. Dilated common bile duct as well as intrahepatic biliary ductal dilatation. A   distal stone or stricture could account for this appearance. An MRCP or ERCP may   be beneficial there is further clinical concern. Current Meds:  Current Facility-Administered Medications   Medication Dose Route Frequency    dextrose 5 % and 0.45 % NaCl with KCl 20 mEq infusion   IntraVENous Continuous    famotidine (PEPCID) 20 mg in sodium chloride (PF) 10 mL injection  20 mg IntraVENous Q12H    sodium chloride flush 0.9 % injection 5-40 mL  5-40 mL IntraVENous 2 times per day    sodium chloride flush 0.9 % injection 5-40 mL  5-40 mL IntraVENous PRN    0.9 % sodium chloride infusion   IntraVENous PRN    enoxaparin (LOVENOX) injection 40 mg  40 mg SubCUTAneous Q24H    ondansetron (ZOFRAN-ODT) disintegrating tablet 4 mg  4 mg Oral Q8H PRN    Or    ondansetron (ZOFRAN) injection 4 mg  4 mg IntraVENous Q6H PRN    acetaminophen (TYLENOL) tablet 650 mg  650 mg Oral Q6H PRN    Or    acetaminophen (TYLENOL) suppository 650 mg  650 mg Rectal Q6H PRN    piperacillin-tazobactam (ZOSYN) 3,375 mg in sodium chloride 0.9 % 50 mL IVPB (mini-bag)  3,375 mg IntraVENous q8h    HYDROmorphone HCl PF (DILAUDID) injection 0.5 mg  0.5 mg IntraVENous Q4H PRN    oxyCODONE (ROXICODONE) immediate release tablet 5 mg  5 mg Oral Q6H PRN    glucagon (rDNA) injection 1 mg  1 mg IntraVENous Once       Signed:  Annamaria Kincaid MD    Part of this note may have been written by using a voice dictation software. The note has been proof read but may still contain some grammatical/other typographical errors.

## 2022-06-09 NOTE — CONSULTS
Duplicate order. See consult note from 6/8/2022 by Dr. Vicente Cuenca and Byron Coronel [place].   JOE Jones

## 2022-06-09 NOTE — PROGRESS NOTES
[facility] Progress Note         Admit Date:  6/8/2022    Today's Date:  6/9/2022    CC:  Gallstone pancreatitis. Subjective:     Patient is s/p ERCP on 6/8 outlined below. Reports improvement in upper abdominal pain to 4/10, today. Denies any nausea/vomiting today. Reports that she does not have an appetite. No BM since admission. Signed              Show:Clear all  [x]Manual[x]Template[]Copied    Added by:  [x]JEMMA Ojeda MD      []Ceci for details    ENDOSCOPIC  RETROGRADE CHOLANGIOPANCREATOGRAPHY     DATE of PROCEDURE: 6/8/2022     PT NAME: Ceci Diamond     [id number]     MEDICATION: general     INSTRUMENT:  [id number]     SPECIAL PROCEDURE:papillotomy w/ balloon sweep- 12/15 mm  BLOOD LOSS- 0 to min. SPEC- no  IMPLANT- none     PROCEDURE: After informed consent, the patient was placed under anesthesia in the semi-prone position. The duodenoscope was passed without difficulty to the area of the ampulla. A standard cannulation and ERCP was performed with the findings as outlined and described below. Patient tolerated the procedure well.     ASSESSMENT:  1. Tight orifice required double guide wire; CBD about 15 mm; minimal sludge extracted- able to pass 15 mm balloon; likely passed a stone  2. Pancreas normal   3.  GB - not seen     PLAN:  1. inpt f/u     JEMMA Gross MD               Medications:   Current Facility-Administered Medications   Medication Dose Route Frequency    dextrose 5 % and 0.45 % NaCl with KCl 20 mEq infusion   IntraVENous Continuous    famotidine (PEPCID) 20 mg in sodium chloride (PF) 10 mL injection  20 mg IntraVENous Q12H    sodium chloride flush 0.9 % injection 5-40 mL  5-40 mL IntraVENous 2 times per day    sodium chloride flush 0.9 % injection 5-40 mL  5-40 mL IntraVENous PRN    0.9 % sodium chloride infusion   IntraVENous PRN    enoxaparin (LOVENOX) injection 40 mg  40 mg SubCUTAneous Q24H    ondansetron (ZOFRAN-ODT) disintegrating tablet 4 mg  4 mg Oral Q8H PRN    Or    ondansetron (ZOFRAN) injection 4 mg  4 mg IntraVENous Q6H PRN    acetaminophen (TYLENOL) tablet 650 mg  650 mg Oral Q6H PRN    Or    acetaminophen (TYLENOL) suppository 650 mg  650 mg Rectal Q6H PRN    piperacillin-tazobactam (ZOSYN) 3,375 mg in sodium chloride 0.9 % 50 mL IVPB (mini-bag)  3,375 mg IntraVENous q8h    HYDROmorphone HCl PF (DILAUDID) injection 0.5 mg  0.5 mg IntraVENous Q4H PRN    oxyCODONE (ROXICODONE) immediate release tablet 5 mg  5 mg Oral Q6H PRN    glucagon (rDNA) injection 1 mg  1 mg IntraVENous Once       Review of Systems:  ROS was obtained, with pertinent positives as listed above. No chest pain or SOB. Diet:  NPO    Objective:   Vitals:  /69   Pulse 67   Temp 98.6 °F (37 °C) (Oral)   Resp 16   Ht 5' 5\" (1.651 m)   Wt 193 lb (87.5 kg)   SpO2 99%   BMI 32.12 kg/m²   Intake/Output:  No intake/output data recorded. 06/07 1901 - 06/09 0700  In: 2661 [I.V.:1561]  Out: 0   Exam:  General appearance: alert, cooperative, no distress  Lungs: clear to auscultation bilaterally anteriorly  Heart: regular rate and rhythm  Abdomen: soft, mildly tender across the upper abdomen. Bowel sounds normal. No masses, no organomegaly  Extremities: extremities normal, atraumatic, no cyanosis or edema  Neuro:  alert and oriented    Data Review (Labs):    Recent Labs     06/08/22  0938 06/09/22  0327   WBC 23.6* 18.9*   HGB 14.3 11.6*   HCT 42.5 35.3*    191   MCV 86.0 89.4   * 140   K 3.8 3.6    107   CO2 27 22   BUN 8 10   AST 87* 38*   * 212*       Assessment:     Principal Problem:    Gallstone pancreatitis  Active Problems:    Choledocholithiasis with acute cholecystitis    Hyponatremia    Obesity (BMI 30-39. 9)    Leukocytosis    Elevated LFTs    Dilated cbd, acquired    Acute pancreatitis  Resolved Problems:    * No resolved hospital problems.  *         24 y/o Female with known gallstones who presented with recent onset of upper abdominal pains with N/V. Work-up in the ER revealed , AST 87, , and normal T bili. Lipase elevated in the 6000s. WBC 23k. US revealed dilated CBD and intrahepatics raising the suspicion for CBD stone / gallstone pancreatitis, gallstones +/- acute or chronic cholecystitis. She saw a surgeon several years ago for similar abdominal pains and at that time deferred CCY due to lack of insurance. S/P ERCP on 6/8/2022 outlined above. 15mm CBD with sludge, likely passed stone.       6/9/2022: TB 0.6, AST 38, , . WBC 18.9. LFT improving. Plan:   Appreciate surgical consult. Plans are for cholecystectomy prior to discharge  On Zosyn  Trial of sips of clears  IVF at 125/hour. Supportive care  Pain control and nausea control per primary      JOE Amador    Patient is seen and examined in collaboration with Dr. Benedicto Garcia. .  Assessment and plan as per Dr. Benedicto Garcia.

## 2022-06-09 NOTE — CARE COORDINATION
Spoke with Ms. Grey in room 611 about discharge planning. She is a student studying to be a  at [employer], and living with her parents. Prior to this admit, she was completley independent with ADLs. She has a PCP, but no health insurance. Sent e-mail to [facility]. At discharge, plan is home, no additional needs identified. Her only request is a work excuse letter (she is doing work-study program at a  office). Letter provided.          06/09/22 0600   Service Assessment   Patient Orientation Alert and Oriented   History Provided By Patient   Primary Caregiver Self   Support Systems Parent  (lives with parents)   PCP Verified by CM Yes  (Dr. Luna Eason at [facility])   Prior Functional Level Independent in ADLs/IADLs   Condition of Participation: Discharge Planning   The Plan for Transition of Care is related to the following treatment goals: home with parents when stable

## 2022-06-10 ENCOUNTER — ANESTHESIA (OUTPATIENT)
Dept: SURGERY | Age: 22
DRG: 417 | End: 2022-06-10

## 2022-06-10 PROBLEM — D72.829 LEUKOCYTOSIS: Status: RESOLVED | Noted: 2022-06-09 | Resolved: 2022-06-10

## 2022-06-10 LAB
ALBUMIN SERPL-MCNC: 2.9 G/DL (ref 3.5–5)
ALBUMIN/GLOB SERPL: 0.9 {RATIO} (ref 1.2–3.5)
ALP SERPL-CCNC: 111 U/L (ref 50–136)
ALT SERPL-CCNC: 129 U/L (ref 12–65)
ANION GAP SERPL CALC-SCNC: 5 MMOL/L (ref 7–16)
AST SERPL-CCNC: 17 U/L (ref 15–37)
BASOPHILS # BLD: 0 K/UL (ref 0–0.2)
BASOPHILS NFR BLD: 0 % (ref 0–2)
BILIRUB SERPL-MCNC: 0.6 MG/DL (ref 0.2–1.1)
BUN SERPL-MCNC: 4 MG/DL (ref 6–23)
CALCIUM SERPL-MCNC: 8.4 MG/DL (ref 8.3–10.4)
CHLORIDE SERPL-SCNC: 106 MMOL/L (ref 98–107)
CO2 SERPL-SCNC: 27 MMOL/L (ref 21–32)
CREAT SERPL-MCNC: 0.5 MG/DL (ref 0.6–1)
DIFFERENTIAL METHOD BLD: ABNORMAL
EOSINOPHIL # BLD: 0.4 K/UL (ref 0–0.8)
EOSINOPHIL NFR BLD: 3 % (ref 0.5–7.8)
ERYTHROCYTE [DISTWIDTH] IN BLOOD BY AUTOMATED COUNT: 12.8 % (ref 11.9–14.6)
GLOBULIN SER CALC-MCNC: 3.3 G/DL (ref 2.3–3.5)
GLUCOSE SERPL-MCNC: 96 MG/DL (ref 65–100)
HCT VFR BLD AUTO: 34.8 % (ref 35.8–46.3)
HGB BLD-MCNC: 11.7 G/DL (ref 11.7–15.4)
IMM GRANULOCYTES # BLD AUTO: 0.1 K/UL (ref 0–0.5)
IMM GRANULOCYTES NFR BLD AUTO: 0 % (ref 0–5)
LIPASE SERPL-CCNC: 533 U/L (ref 73–393)
LYMPHOCYTES # BLD: 1.4 K/UL (ref 0.5–4.6)
LYMPHOCYTES NFR BLD: 10 % (ref 13–44)
MCH RBC QN AUTO: 30 PG (ref 26.1–32.9)
MCHC RBC AUTO-ENTMCNC: 33.6 G/DL (ref 31.4–35)
MCV RBC AUTO: 89.2 FL (ref 79.6–97.8)
MONOCYTES # BLD: 1.2 K/UL (ref 0.1–1.3)
MONOCYTES NFR BLD: 8 % (ref 4–12)
NEUTS SEG # BLD: 11.6 K/UL (ref 1.7–8.2)
NEUTS SEG NFR BLD: 79 % (ref 43–78)
NRBC # BLD: 0 K/UL (ref 0–0.2)
PLATELET # BLD AUTO: 186 K/UL (ref 150–450)
PMV BLD AUTO: 12 FL (ref 9.4–12.3)
POTASSIUM SERPL-SCNC: 3.6 MMOL/L (ref 3.5–5.1)
PROT SERPL-MCNC: 6.2 G/DL (ref 6.3–8.2)
RBC # BLD AUTO: 3.9 M/UL (ref 4.05–5.2)
SODIUM SERPL-SCNC: 138 MMOL/L (ref 136–145)
WBC # BLD AUTO: 14.7 K/UL (ref 4.3–11.1)

## 2022-06-10 PROCEDURE — 99233 SBSQ HOSP IP/OBS HIGH 50: CPT | Performed by: SURGERY

## 2022-06-10 PROCEDURE — 2580000003 HC RX 258: Performed by: NURSE ANESTHETIST, CERTIFIED REGISTERED

## 2022-06-10 PROCEDURE — 2500000003 HC RX 250 WO HCPCS: Performed by: NURSE ANESTHETIST, CERTIFIED REGISTERED

## 2022-06-10 PROCEDURE — 2580000003 HC RX 258: Performed by: SURGERY

## 2022-06-10 PROCEDURE — 2580000003 HC RX 258: Performed by: INTERNAL MEDICINE

## 2022-06-10 PROCEDURE — 2500000003 HC RX 250 WO HCPCS: Performed by: SURGERY

## 2022-06-10 PROCEDURE — 3700000001 HC ADD 15 MINUTES (ANESTHESIA): Performed by: SURGERY

## 2022-06-10 PROCEDURE — 83690 ASSAY OF LIPASE: CPT

## 2022-06-10 PROCEDURE — 0FT44ZZ RESECTION OF GALLBLADDER, PERCUTANEOUS ENDOSCOPIC APPROACH: ICD-10-PCS | Performed by: SURGERY

## 2022-06-10 PROCEDURE — 47562 LAPAROSCOPIC CHOLECYSTECTOMY: CPT | Performed by: SURGERY

## 2022-06-10 PROCEDURE — 6360000002 HC RX W HCPCS: Performed by: NURSE ANESTHETIST, CERTIFIED REGISTERED

## 2022-06-10 PROCEDURE — A4216 STERILE WATER/SALINE, 10 ML: HCPCS | Performed by: SURGERY

## 2022-06-10 PROCEDURE — 85025 COMPLETE CBC W/AUTO DIFF WBC: CPT

## 2022-06-10 PROCEDURE — 7100000001 HC PACU RECOVERY - ADDTL 15 MIN: Performed by: SURGERY

## 2022-06-10 PROCEDURE — 88304 TISSUE EXAM BY PATHOLOGIST: CPT

## 2022-06-10 PROCEDURE — 36415 COLL VENOUS BLD VENIPUNCTURE: CPT

## 2022-06-10 PROCEDURE — 3600000004 HC SURGERY LEVEL 4 BASE: Performed by: SURGERY

## 2022-06-10 PROCEDURE — 3600000014 HC SURGERY LEVEL 4 ADDTL 15MIN: Performed by: SURGERY

## 2022-06-10 PROCEDURE — 3700000000 HC ANESTHESIA ATTENDED CARE: Performed by: SURGERY

## 2022-06-10 PROCEDURE — 6370000000 HC RX 637 (ALT 250 FOR IP): Performed by: INTERNAL MEDICINE

## 2022-06-10 PROCEDURE — 2709999900 HC NON-CHARGEABLE SUPPLY: Performed by: SURGERY

## 2022-06-10 PROCEDURE — 6360000002 HC RX W HCPCS: Performed by: INTERNAL MEDICINE

## 2022-06-10 PROCEDURE — 6370000000 HC RX 637 (ALT 250 FOR IP): Performed by: SURGERY

## 2022-06-10 PROCEDURE — 80053 COMPREHEN METABOLIC PANEL: CPT

## 2022-06-10 PROCEDURE — 1100000000 HC RM PRIVATE

## 2022-06-10 PROCEDURE — 7100000000 HC PACU RECOVERY - FIRST 15 MIN: Performed by: SURGERY

## 2022-06-10 RX ORDER — DEXAMETHASONE SODIUM PHOSPHATE 10 MG/ML
INJECTION INTRAMUSCULAR; INTRAVENOUS PRN
Status: DISCONTINUED | OUTPATIENT
Start: 2022-06-10 | End: 2022-06-10 | Stop reason: SDUPTHER

## 2022-06-10 RX ORDER — ONDANSETRON 2 MG/ML
4 INJECTION INTRAMUSCULAR; INTRAVENOUS
Status: DISCONTINUED | OUTPATIENT
Start: 2022-06-10 | End: 2022-06-10 | Stop reason: HOSPADM

## 2022-06-10 RX ORDER — PROPOFOL 10 MG/ML
INJECTION, EMULSION INTRAVENOUS PRN
Status: DISCONTINUED | OUTPATIENT
Start: 2022-06-10 | End: 2022-06-10 | Stop reason: SDUPTHER

## 2022-06-10 RX ORDER — DEXTROSE, SODIUM CHLORIDE, AND POTASSIUM CHLORIDE 5; .45; .15 G/100ML; G/100ML; G/100ML
INJECTION INTRAVENOUS CONTINUOUS
Status: DISCONTINUED | OUTPATIENT
Start: 2022-06-10 | End: 2022-06-11 | Stop reason: HOSPADM

## 2022-06-10 RX ORDER — DIPHENHYDRAMINE HYDROCHLORIDE 50 MG/ML
12.5 INJECTION INTRAMUSCULAR; INTRAVENOUS
Status: DISCONTINUED | OUTPATIENT
Start: 2022-06-10 | End: 2022-06-10 | Stop reason: HOSPADM

## 2022-06-10 RX ORDER — HYDROMORPHONE HYDROCHLORIDE 1 MG/ML
INJECTION, SOLUTION INTRAMUSCULAR; INTRAVENOUS; SUBCUTANEOUS PRN
Status: DISCONTINUED | OUTPATIENT
Start: 2022-06-10 | End: 2022-06-10

## 2022-06-10 RX ORDER — FENTANYL CITRATE 50 UG/ML
INJECTION, SOLUTION INTRAMUSCULAR; INTRAVENOUS PRN
Status: DISCONTINUED | OUTPATIENT
Start: 2022-06-10 | End: 2022-06-10 | Stop reason: SDUPTHER

## 2022-06-10 RX ORDER — GLYCOPYRROLATE 0.2 MG/ML
INJECTION INTRAMUSCULAR; INTRAVENOUS PRN
Status: DISCONTINUED | OUTPATIENT
Start: 2022-06-10 | End: 2022-06-10 | Stop reason: SDUPTHER

## 2022-06-10 RX ORDER — OXYCODONE HYDROCHLORIDE 5 MG/1
5 TABLET ORAL PRN
Status: DISCONTINUED | OUTPATIENT
Start: 2022-06-10 | End: 2022-06-10 | Stop reason: HOSPADM

## 2022-06-10 RX ORDER — FENTANYL CITRATE 50 UG/ML
100 INJECTION, SOLUTION INTRAMUSCULAR; INTRAVENOUS
Status: DISCONTINUED | OUTPATIENT
Start: 2022-06-10 | End: 2022-06-10 | Stop reason: HOSPADM

## 2022-06-10 RX ORDER — ONDANSETRON 2 MG/ML
INJECTION INTRAMUSCULAR; INTRAVENOUS PRN
Status: DISCONTINUED | OUTPATIENT
Start: 2022-06-10 | End: 2022-06-10 | Stop reason: SDUPTHER

## 2022-06-10 RX ORDER — SODIUM CHLORIDE 0.9 % (FLUSH) 0.9 %
5-40 SYRINGE (ML) INJECTION EVERY 12 HOURS SCHEDULED
Status: DISCONTINUED | OUTPATIENT
Start: 2022-06-10 | End: 2022-06-10 | Stop reason: HOSPADM

## 2022-06-10 RX ORDER — FAMOTIDINE 20 MG/1
20 TABLET, FILM COATED ORAL 2 TIMES DAILY
Status: DISCONTINUED | OUTPATIENT
Start: 2022-06-10 | End: 2022-06-11 | Stop reason: HOSPADM

## 2022-06-10 RX ORDER — ENOXAPARIN SODIUM 100 MG/ML
40 INJECTION SUBCUTANEOUS EVERY 24 HOURS
Status: DISCONTINUED | OUTPATIENT
Start: 2022-06-11 | End: 2022-06-11 | Stop reason: HOSPADM

## 2022-06-10 RX ORDER — ROCURONIUM BROMIDE 10 MG/ML
INJECTION, SOLUTION INTRAVENOUS PRN
Status: DISCONTINUED | OUTPATIENT
Start: 2022-06-10 | End: 2022-06-10 | Stop reason: SDUPTHER

## 2022-06-10 RX ORDER — SODIUM CHLORIDE, SODIUM LACTATE, POTASSIUM CHLORIDE, CALCIUM CHLORIDE 600; 310; 30; 20 MG/100ML; MG/100ML; MG/100ML; MG/100ML
INJECTION, SOLUTION INTRAVENOUS CONTINUOUS
Status: DISCONTINUED | OUTPATIENT
Start: 2022-06-10 | End: 2022-06-10 | Stop reason: HOSPADM

## 2022-06-10 RX ORDER — NEOSTIGMINE METHYLSULFATE 1 MG/ML
INJECTION, SOLUTION INTRAVENOUS PRN
Status: DISCONTINUED | OUTPATIENT
Start: 2022-06-10 | End: 2022-06-10 | Stop reason: SDUPTHER

## 2022-06-10 RX ORDER — OXYCODONE HYDROCHLORIDE 5 MG/1
10 TABLET ORAL PRN
Status: DISCONTINUED | OUTPATIENT
Start: 2022-06-10 | End: 2022-06-10 | Stop reason: HOSPADM

## 2022-06-10 RX ORDER — SODIUM CHLORIDE, SODIUM LACTATE, POTASSIUM CHLORIDE, CALCIUM CHLORIDE 600; 310; 30; 20 MG/100ML; MG/100ML; MG/100ML; MG/100ML
INJECTION, SOLUTION INTRAVENOUS CONTINUOUS PRN
Status: DISCONTINUED | OUTPATIENT
Start: 2022-06-10 | End: 2022-06-10 | Stop reason: SDUPTHER

## 2022-06-10 RX ORDER — HYDROMORPHONE HYDROCHLORIDE 2 MG/ML
0.5 INJECTION, SOLUTION INTRAMUSCULAR; INTRAVENOUS; SUBCUTANEOUS EVERY 5 MIN PRN
Status: DISCONTINUED | OUTPATIENT
Start: 2022-06-10 | End: 2022-06-10 | Stop reason: HOSPADM

## 2022-06-10 RX ORDER — MIDAZOLAM HYDROCHLORIDE 2 MG/2ML
2 INJECTION, SOLUTION INTRAMUSCULAR; INTRAVENOUS
Status: DISCONTINUED | OUTPATIENT
Start: 2022-06-10 | End: 2022-06-10 | Stop reason: HOSPADM

## 2022-06-10 RX ORDER — BUPIVACAINE HYDROCHLORIDE 2.5 MG/ML
INJECTION, SOLUTION EPIDURAL; INFILTRATION; INTRACAUDAL PRN
Status: DISCONTINUED | OUTPATIENT
Start: 2022-06-10 | End: 2022-06-10 | Stop reason: ALTCHOICE

## 2022-06-10 RX ORDER — CEFAZOLIN SODIUM 1 G/3ML
INJECTION, POWDER, FOR SOLUTION INTRAMUSCULAR; INTRAVENOUS PRN
Status: DISCONTINUED | OUTPATIENT
Start: 2022-06-10 | End: 2022-06-10 | Stop reason: SDUPTHER

## 2022-06-10 RX ORDER — HYDROMORPHONE HCL 110MG/55ML
PATIENT CONTROLLED ANALGESIA SYRINGE INTRAVENOUS PRN
Status: DISCONTINUED | OUTPATIENT
Start: 2022-06-10 | End: 2022-06-10 | Stop reason: SDUPTHER

## 2022-06-10 RX ORDER — SODIUM CHLORIDE 9 MG/ML
INJECTION, SOLUTION INTRAVENOUS CONTINUOUS PRN
Status: DISCONTINUED | OUTPATIENT
Start: 2022-06-10 | End: 2022-06-10 | Stop reason: SDUPTHER

## 2022-06-10 RX ORDER — LIDOCAINE HYDROCHLORIDE 20 MG/ML
INJECTION, SOLUTION EPIDURAL; INFILTRATION; INTRACAUDAL; PERINEURAL PRN
Status: DISCONTINUED | OUTPATIENT
Start: 2022-06-10 | End: 2022-06-10 | Stop reason: SDUPTHER

## 2022-06-10 RX ORDER — ACETAMINOPHEN 500 MG
1000 TABLET ORAL ONCE
Status: DISCONTINUED | OUTPATIENT
Start: 2022-06-10 | End: 2022-06-10 | Stop reason: HOSPADM

## 2022-06-10 RX ORDER — HYDROCODONE BITARTRATE AND ACETAMINOPHEN 5; 325 MG/1; MG/1
1-2 TABLET ORAL EVERY 8 HOURS PRN
Qty: 28 TABLET | Refills: 0 | Status: SHIPPED | OUTPATIENT
Start: 2022-06-10 | End: 2022-06-17

## 2022-06-10 RX ORDER — SODIUM CHLORIDE 0.9 % (FLUSH) 0.9 %
5-40 SYRINGE (ML) INJECTION PRN
Status: DISCONTINUED | OUTPATIENT
Start: 2022-06-10 | End: 2022-06-10 | Stop reason: HOSPADM

## 2022-06-10 RX ADMIN — ONDANSETRON 4 MG: 2 INJECTION INTRAMUSCULAR; INTRAVENOUS at 05:47

## 2022-06-10 RX ADMIN — GLYCOPYRROLATE 0.6 MG: 0.2 INJECTION, SOLUTION INTRAMUSCULAR; INTRAVENOUS at 14:16

## 2022-06-10 RX ADMIN — DEXAMETHASONE SODIUM PHOSPHATE 10 MG: 10 INJECTION INTRAMUSCULAR; INTRAVENOUS at 13:33

## 2022-06-10 RX ADMIN — PROPOFOL 200 MG: 10 INJECTION, EMULSION INTRAVENOUS at 13:21

## 2022-06-10 RX ADMIN — DEXTROSE MONOHYDRATE, SODIUM CHLORIDE, AND POTASSIUM CHLORIDE: 50; 4.5; 1.49 INJECTION, SOLUTION INTRAVENOUS at 20:27

## 2022-06-10 RX ADMIN — DEXTROSE MONOHYDRATE, SODIUM CHLORIDE, AND POTASSIUM CHLORIDE: 50; 4.5; 1.49 INJECTION, SOLUTION INTRAVENOUS at 15:27

## 2022-06-10 RX ADMIN — HYDROMORPHONE HYDROCHLORIDE 0.2 MG: 2 INJECTION INTRAMUSCULAR; INTRAVENOUS; SUBCUTANEOUS at 13:58

## 2022-06-10 RX ADMIN — HYDROMORPHONE HYDROCHLORIDE 0.3 MG: 2 INJECTION INTRAMUSCULAR; INTRAVENOUS; SUBCUTANEOUS at 14:28

## 2022-06-10 RX ADMIN — OXYCODONE 5 MG: 5 TABLET ORAL at 05:47

## 2022-06-10 RX ADMIN — SODIUM CHLORIDE, PRESERVATIVE FREE 20 MG: 5 INJECTION INTRAVENOUS at 09:34

## 2022-06-10 RX ADMIN — HYDROMORPHONE HYDROCHLORIDE 0.5 MG: 2 INJECTION INTRAMUSCULAR; INTRAVENOUS; SUBCUTANEOUS at 13:47

## 2022-06-10 RX ADMIN — ROCURONIUM BROMIDE 10 MG: 50 INJECTION, SOLUTION INTRAVENOUS at 13:29

## 2022-06-10 RX ADMIN — ROCURONIUM BROMIDE 35 MG: 50 INJECTION, SOLUTION INTRAVENOUS at 13:21

## 2022-06-10 RX ADMIN — Medication 4 MG: at 14:16

## 2022-06-10 RX ADMIN — PIPERACILLIN AND TAZOBACTAM 3375 MG: 3; .375 INJECTION, POWDER, LYOPHILIZED, FOR SOLUTION INTRAVENOUS at 17:15

## 2022-06-10 RX ADMIN — LIDOCAINE HYDROCHLORIDE 80 MG: 20 INJECTION, SOLUTION EPIDURAL; INFILTRATION; INTRACAUDAL; PERINEURAL at 13:21

## 2022-06-10 RX ADMIN — FAMOTIDINE 20 MG: 20 TABLET, FILM COATED ORAL at 20:26

## 2022-06-10 RX ADMIN — ONDANSETRON 4 MG: 2 INJECTION INTRAMUSCULAR; INTRAVENOUS at 13:33

## 2022-06-10 RX ADMIN — CEFAZOLIN SODIUM 2000 MG: 1 INJECTION, POWDER, FOR SOLUTION INTRAMUSCULAR; INTRAVENOUS at 13:36

## 2022-06-10 RX ADMIN — SODIUM CHLORIDE: 900 INJECTION INTRAVENOUS at 14:23

## 2022-06-10 RX ADMIN — SODIUM CHLORIDE, PRESERVATIVE FREE 10 ML: 5 INJECTION INTRAVENOUS at 15:28

## 2022-06-10 RX ADMIN — DEXTROSE MONOHYDRATE, SODIUM CHLORIDE, AND POTASSIUM CHLORIDE: 50; 4.5; 1.49 INJECTION, SOLUTION INTRAVENOUS at 03:16

## 2022-06-10 RX ADMIN — PIPERACILLIN AND TAZOBACTAM 3375 MG: 3; .375 INJECTION, POWDER, LYOPHILIZED, FOR SOLUTION INTRAVENOUS at 09:33

## 2022-06-10 RX ADMIN — SODIUM CHLORIDE, SODIUM LACTATE, POTASSIUM CHLORIDE, AND CALCIUM CHLORIDE: 600; 310; 30; 20 INJECTION, SOLUTION INTRAVENOUS at 13:14

## 2022-06-10 RX ADMIN — PIPERACILLIN AND TAZOBACTAM 3375 MG: 3; .375 INJECTION, POWDER, LYOPHILIZED, FOR SOLUTION INTRAVENOUS at 01:35

## 2022-06-10 RX ADMIN — FENTANYL CITRATE 100 MCG: 50 INJECTION INTRAMUSCULAR; INTRAVENOUS at 13:19

## 2022-06-10 ASSESSMENT — PAIN DESCRIPTION - ORIENTATION: ORIENTATION: RIGHT

## 2022-06-10 ASSESSMENT — PAIN - FUNCTIONAL ASSESSMENT
PAIN_FUNCTIONAL_ASSESSMENT: NONE - DENIES PAIN
PAIN_FUNCTIONAL_ASSESSMENT: NONE - DENIES PAIN
PAIN_FUNCTIONAL_ASSESSMENT: 0-10
PAIN_FUNCTIONAL_ASSESSMENT: NONE - DENIES PAIN
PAIN_FUNCTIONAL_ASSESSMENT: ACTIVITIES ARE NOT PREVENTED

## 2022-06-10 ASSESSMENT — PAIN SCALES - GENERAL
PAINLEVEL_OUTOF10: 0
PAINLEVEL_OUTOF10: 6
PAINLEVEL_OUTOF10: 1

## 2022-06-10 ASSESSMENT — PAIN DESCRIPTION - DESCRIPTORS: DESCRIPTORS: ACHING

## 2022-06-10 ASSESSMENT — PAIN DESCRIPTION - LOCATION: LOCATION: ABDOMEN

## 2022-06-10 NOTE — PROGRESS NOTES
Hourly rounds in progress. Up ad ravi. Medicated for abd pain PO x 2 and nausea x 1.   NPO after MN; patient says Surgeon told her she is going to surgery today

## 2022-06-10 NOTE — PROGRESS NOTES
H&P/Consult Note/Progress Note/Office Note:   Felton Allen  MRN: [id number]  DEEDEE:[birth year]  Age:22 y.o.    HPI: Felton Allen is a 23 y.o. female who came to the ER and was admitted by the Hospitalists with a  3 day h/o progressive N/V/upper abd pain/epigastric pain. Nothing made pain better or worse. Pain 5/10 on arrival   No associated fever. She has been known to have gallstones for 3 yrs but trying homeopathic treatment instead of surgery    In ER WBC 23.6k, T Bili 0.9, AST/ALT 87/383, alk phosp 166  Lipase 6486    GI and General Surgery were consulted by MUNA Riggs  She had ERCP on day of admission  6/8/22 s/p ERCP; Dr Whiting Congress  CBD 15mm, minimal sludge and likely a prior passed stone; pancreas normal, GB not seen        6/8/22 RUQ Abd US  Homogeneous echotexture throughout the liver. There is mild    intrahepatic biliary ductal dilatation. There is no focal hepatic mass. Gallstones are present within the gallbladder lumen. There is gallbladder wall   thickening measuring 5 mm. No pericholecystic fluid. There was a   reported negative sonographic Jcakson's sign. CBD dilated measuring 14mm. Visualized portions of the pancreas were unremarkable however the tail was   obscured by overlying bowel gas. The right kidney is unremarkable without   hydronephrosis or solid mass. The right kidney was measured at approximately 11.1cm. The visualized portions of the abdominal aorta and inferior vena cava are   unremarkable. There is normal hepatopetal flow within the main portal vein. No   free fluid is seen within the right upper quadrant. mpression   1. Cholelithiasis with mild gallbladder wall thickening raising the possibility   of acute or chronic cholecystitis. 2. Dilated common bile duct as well as intrahepatic biliary ductal dilatation. A   distal stone or stricture could account for this appearance.  An MRCP or ERCP may   be beneficial there is further clinical concern. Additonal hx:  6/10/22 Feels better; some epigastric discomfort but much improved compared to admission, doesn't feel distended; WBC lower at 23.6k-->14.7k, T Bili normal; Lipase 533; Proceed with lap annamaria today            History reviewed. No pertinent past medical history.   Past Surgical History:   Procedure Laterality Date    ERCP N/A 6/8/2022    ERCP SPHINCTER/PAPILLOTOMY with balloon sweep performed by JEMMA Levy MD at [facility]    ERCP W/ SPHICTEROTOMY N/A 06/08/2022     Current Facility-Administered Medications   Medication Dose Route Frequency    acetaminophen (TYLENOL) tablet 1,000 mg  1,000 mg Oral Once    fentaNYL (SUBLIMAZE) injection 100 mcg  100 mcg IntraVENous Once PRN    lactated ringers infusion   IntraVENous Continuous    sodium chloride flush 0.9 % injection 5-40 mL  5-40 mL IntraVENous 2 times per day    sodium chloride flush 0.9 % injection 5-40 mL  5-40 mL IntraVENous PRN    midazolam PF (VERSED) injection 2 mg  2 mg IntraVENous Once PRN    dextrose 5 % and 0.45 % NaCl with KCl 20 mEq infusion   IntraVENous Continuous    famotidine (PEPCID) 20 mg in sodium chloride (PF) 10 mL injection  20 mg IntraVENous Q12H    sodium chloride flush 0.9 % injection 5-40 mL  5-40 mL IntraVENous 2 times per day    sodium chloride flush 0.9 % injection 5-40 mL  5-40 mL IntraVENous PRN    0.9 % sodium chloride infusion   IntraVENous PRN    enoxaparin (LOVENOX) injection 40 mg  40 mg SubCUTAneous Q24H    ondansetron (ZOFRAN-ODT) disintegrating tablet 4 mg  4 mg Oral Q8H PRN    Or    ondansetron (ZOFRAN) injection 4 mg  4 mg IntraVENous Q6H PRN    acetaminophen (TYLENOL) tablet 650 mg  650 mg Oral Q6H PRN    Or    acetaminophen (TYLENOL) suppository 650 mg  650 mg Rectal Q6H PRN    piperacillin-tazobactam (ZOSYN) 3,375 mg in sodium chloride 0.9 % 50 mL IVPB (mini-bag)  3,375 mg IntraVENous q8h    HYDROmorphone HCl PF (DILAUDID) injection 0.5 mg  0.5 mg IntraVENous Q4H PRN    oxyCODONE (ROXICODONE) immediate release tablet 5 mg  5 mg Oral Q6H PRN    glucagon (rDNA) injection 1 mg  1 mg IntraVENous Once     Patient has no known allergies. Social History     Socioeconomic History    Marital status: Single     Spouse name: None    Number of children: None    Years of education: None    Highest education level: None   Occupational History    None   Tobacco Use    Smoking status: Never Smoker    Smokeless tobacco: Never Used   Substance and Sexual Activity    Alcohol use: None    Drug use: None    Sexual activity: None   Other Topics Concern    None   Social History Narrative    None     Social Determinants of Health     Financial Resource Strain:     Difficulty of Paying Living Expenses: Not on file   Food Insecurity:     Worried About Running Out of Food in the Last Year: Not on file    Dm of Food in the Last Year: Not on file   Transportation Needs:     Lack of Transportation (Medical): Not on file    Lack of Transportation (Non-Medical):  Not on file   Physical Activity:     Days of Exercise per Week: Not on file    Minutes of Exercise per Session: Not on file   Stress:     Feeling of Stress : Not on file   Social Connections:     Frequency of Communication with Friends and Family: Not on file    Frequency of Social Gatherings with Friends and Family: Not on file    Attends Gnosticism Services: Not on file    Active Member of [address] [place] [address] or Organizations: Not on file    Attends Club or Organization Meetings: Not on file    Marital Status: Not on file   Intimate Partner Violence:     Fear of Current or Ex-Partner: Not on file    Emotionally Abused: Not on file    Physically Abused: Not on file    Sexually Abused: Not on file   Housing Stability:     Unable to Pay for Housing in the Last Year: Not on file    Number of [place] in the Last Year: Not on file    Unstable Housing in the Last Year: Not on file     Social History Tobacco Use   Smoking Status Never Smoker   Smokeless Tobacco Never Used     History reviewed. No pertinent family history. ROS: The patient has no difficulty with chest pain or shortness of breath. No fever or chills. Comprehensive review of systems was otherwise unremarkable except as noted above. Physical Exam:   /68   Pulse 90   Temp 99.2 °F (37.3 °C) (Oral)   Resp 16   Ht 5' 5\" (1.651 m)   Wt 193 lb (87.5 kg)   SpO2 97%   BMI 32.12 kg/m²   Vitals:    06/10/22 0325 06/10/22 0547 06/10/22 0700 06/10/22 1136   BP: 126/77  113/68    Pulse: 84  88 90   Resp: 15 16 16 16   Temp: 98.1 °F (36.7 °C)  98.1 °F (36.7 °C) 99.2 °F (37.3 °C)   TempSrc: Oral  Oral Oral   SpO2: 97%  97% 97%   Weight:    193 lb (87.5 kg)   Height:    5' 5\" (1.651 m)     No intake/output data recorded. 06/08 1901 - 06/10 0700  In: 3966 [P.O.:300; I.V.:911]  Out: -     Constitutional: Alert, oriented, cooperative patient in no acute distress; appears stated age    Eyes:Sclera are clear. EOMs intact  ENMT: no external lesions gross hearing normal; no obvious neck masses, no ear or lip lesions, nares normal  CV: RRR. Normal perfusion  Resp: No JVD. Breathing is  non-labored; no audible wheezing. GI: soft and non-distended; epigastric tenderness     Musculoskeletal: unremarkable with normal function. No embolic signs or cyanosis.    Neuro:  Oriented; moves all 4; no focal deficits  Psychiatric: normal affect and mood, no memory impairment    Recent vitals (if inpt):  Patient Vitals for the past 24 hrs:   BP Temp Temp src Pulse Resp SpO2 Height Weight   06/10/22 1136  99.2 °F (37.3 °C) Oral 90 16 97 % 5' 5\" (1.651 m) 193 lb (87.5 kg)   06/10/22 0700 113/68 98.1 °F (36.7 °C) Oral 88 16 97 %     06/10/22 0547     16      06/10/22 0325 126/77 98.1 °F (36.7 °C) Oral 84 15 97 %     06/09/22 2334 136/76 98.4 °F (36.9 °C) Oral 84 15 99 %     06/09/22 2001 120/70 98.4 °F (36.9 °C) Oral 76 17 99 %     06/09/22 1613 126/78 99.1 °F (37.3 °C) Oral 78 16 99 %         Amount and/or Complexity of Data Reviewed and Analyzed:  I reviewed and analyzed all of the unique labs and radiologic studies that are shown below as well as any that are in the HPI, and any that are in the expanded problem list below  *Each unique test, order, or document contributes to the combination of 2 or combination of 3 in Category 1 below. For this visit I also reviewed old records and prior notes. Recent Labs     06/10/22  0541   WBC 14.7*   HGB 11.7         K 3.6      CO2 27   BUN 4*   *     Review of most recent CBC  Lab Results   Component Value Date    WBC 14.7 (H) 06/10/2022    HGB 11.7 06/10/2022    HCT 34.8 (L) 06/10/2022    MCV 89.2 06/10/2022     06/10/2022       Review of most recent BMP  Lab Results   Component Value Date     06/10/2022    K 3.6 06/10/2022     06/10/2022    CO2 27 06/10/2022    BUN 4 06/10/2022    CREATININE 0.50 06/10/2022    GLUCOSE 96 06/10/2022    CALCIUM 8.4 06/10/2022        Review of most recent LFTs (and lipase if done)  Lab Results   Component Value Date     (H) 06/10/2022    AST 17 06/10/2022    ALKPHOS 111 06/10/2022    BILITOT 0.6 06/10/2022     Lab Results   Component Value Date    LIPASE 533 (H) 06/10/2022       No results found for: INR, APTT, LCAD    Review of most recent HgbA1c  No results found for: LABA1C  No results found for: EAG    Nutritional assessment screen for wound healing issues:  Lab Results   Component Value Date    LABALBU 2.9 (L) 06/10/2022       @lastcovr@    Xray Result (most recent):  No results found for this or any previous visit from the past 3650 days. CT Result (most recent):  No results found for this or any previous visit from the past 3650 days.     US Result (most recent):  US ABDOMEN LIMITED 06/08/2022    Narrative  Right upper quadrant ultrasound    HISTORY: Nausea and vomiting, history of gallstones    Real-time sonography of the right upper quadrant was performed. Comparison: None    FINDINGS: There is homogeneous echotexture throughout the liver. There is mild  intrahepatic biliary ductal dilatation. There is no focal hepatic mass. Gallstones are present within the gallbladder lumen. There is gallbladder wall  thickening measuring 5 mm. There is no pericholecystic fluid. There was a  reported negative sonographic Jackson's sign. The common bile duct is dilated  measuring 14 mm. The visualized portions of the pancreas were unremarkable however the tail was  obscured by overlying bowel gas. The right kidney is unremarkable without  hydronephrosis or solid mass. The right kidney was measured at approximately  11.1 cm. The visualized portions of the abdominal aorta and inferior vena cava are  unremarkable. There is normal hepatopetal flow within the main portal vein. No  free fluid is seen within the right upper quadrant. Impression  1. Cholelithiasis with mild gallbladder wall thickening raising the possibility  of acute or chronic cholecystitis. 2. Dilated common bile duct as well as intrahepatic biliary ductal dilatation. A  distal stone or stricture could account for this appearance. An MRCP or ERCP may  be beneficial there is further clinical concern. Admission date (for inpatients): 6/8/2022   Day of Surgery  Procedure(s):  ERCP ENDOSCOPIC RETROGRADE CHOLANGIOPANCREATOGRAPHY  ER 1        ASSESSMENT/PLAN:  [unfilled]  Principal Problem:    Gallstone pancreatitis  Active Problems:    Choledocholithiasis with acute cholecystitis    Hyponatremia    Obesity (BMI 30-39. 9)    Leukocytosis    Elevated LFTs    Dilated cbd, acquired    Acute pancreatitis  Resolved Problems:    * No resolved hospital problems.  *     Patient Active Problem List    Diagnosis Date Noted    Gallstone pancreatitis 06/09/2022     Priority: Medium    Leukocytosis 06/09/2022     Priority: Medium    Elevated LFTs 06/09/2022 Priority: Medium    Dilated cbd, acquired 06/09/2022     Priority: Medium    Acute pancreatitis      Priority: Medium    Choledocholithiasis with acute cholecystitis 06/08/2022     Priority: Medium    Hyponatremia 06/08/2022     Priority: Medium    Obesity (BMI 30-39.9) 06/08/2022     Priority: Medium          Number and Complexity of Problems addressed and   Risks of complications and/or morbidity of management          Acute gallstone pancreatitis with biliary obstruction  Admit inpt  WBC 23.6k--->18.9k-->14.7k  Lipase 533    s/p ERCP on 6/8/22 with 15mm CBD with sludge; likely passed stone  NPO/IVF/IV Abx    Informed consent was obtained for laparoscopic, possible open, cholecystectomy. Procedure risks were discussed previously including bleeding, infection, recurrent pancreatitis, risk of anesthesia, etc.. All her questions were answered. She is in favor proceeding. We decided to proceed with surgery this afternoon. If surgery goes well and she tolerates liquids tomorrow she may be able to be discharged Saturday, 6/11/2022            Level of MDM (2/3 elements below)  Number and Complexity of Problems Addressed Amount and/or Complexity of Data to be Reviewed and Analyzed  *Each unique test, order, or document contributes to the combination of 2 or combination of 3 in Category 1 below.  Risk of Complications and/or Morbidity or Mortality of pt Management     17479  27792 SF Minimal  1self-limited or minor problem Minimal or none Minimal risk of morbidity from additional diagnostic testing or Rx   68905  89408 Low Low  2or more self-limited or minor problems;    or  1stable chronic illness;    or  6RPMJE, uncomplicated illness or injury   Limited  (Must meet the requirements of at least 1 of the 2 categories)  Category 1: Tests and documents   Any combination of 2 from the following:  Review of prior external note(s) from each unique source*;  review of the result(s) of each unique test*; ordering of each unique test*    or   Category 2: Assessment requiring an independent historian(s)  (For the categories of independent interpretation of tests and discussion of management or test interpretation, see moderate or high) Low risk of morbidity from additional diagnostic testing or treatment     57800  39123 Mod Moderate  1or more chronic illnesses with exacerbation, progression, or side effects of treatment;    or  2or more stable chronic illnesses;    or  1undiagnosed new problem with uncertain prognosis;    or  1acute illness with systemic symptoms;    or  3TJUCB complicated injury   Moderate  (Must meet the requirements of at least 1 out of 3 categories)  Category 1: Tests, documents, or independent historian(s)  Any combination of 3 from the following:   Review of prior external note(s) from each unique source*;  Review of the result(s) of each unique test*;  Ordering of each unique test*;  Assessment requiring an independent historian(s)    or  Category 2: Independent interpretation of tests   Independent interpretation of a test performed by another physician/other qualified health care professional (not separately reported);     or  Category 3: Discussion of management or test interpretation  Discussion of management or test interpretation with external physician/other qualified health care professional/appropriate source (not separately reported)   Moderate risk of morbidity from additional diagnostic testing or treatment  Examples only:  Prescription drug management   Decision regarding minor surgery with identified patient or procedure risk factors  Decision regarding elective major surgery without identified patient or procedure risk factors   Diagnosis or treatment significantly limited by social determinants of health       68535  07402 High High  1or more chronic illnesses with severe exacerbation, progression, or side effects of treatment;    or  1 acute or chronic illness or injury that poses a threat to life or bodily function   Extensive  (Must meet the requirements of at least 2 out of 3 categories)  Category 1: Tests, documents, or independent historian(s)  Any combination of 3 from the following:   Review of prior external note(s) from each unique source*;  Review of the result(s) of each unique test*;   Ordering of each unique test*;   Assessment requiring an independent historian(s)    or   Category 2: Independent interpretation of tests   Independent interpretation of a test performed by another physician/other qualified health care professional (not separately reported);     or  Category 3: Discussion of management or test interpretation  Discussion of management or test interpretation with external physician/other qualified health care professional/appropriate source (not separately reported)   High risk of morbidity from additional diagnostic testing or treatment  Examples only:  Drug therapy requiring intensive monitoring for toxicity  Decision regarding elective major surgery with identified patient or procedure risk factors  Decision regarding emergency major surgery  Decision regarding hospitalization  Decision not to resuscitate or to de-escalate care because of poor prognosis             I have personally performed a face-to-face diagnostic evaluation and management  service on this patient. I have independently seen the patient. I have independently obtained the above history from the patient/family. I have independently examined the patient with above findings. I have independently reviewed data/labs for this patient and developed the above plan of care (MDM).       Signed: Huber Acosta MD  6/10/2022    1:12 PM

## 2022-06-10 NOTE — PERIOP NOTE
TRANSFER - OUT REPORT:    Verbal report given to Husam Gomez RN on [employer]  being transferred to  [phone] for routine post-op       Report consisted of patients Situation, Background, Assessment and   Recommendations(SBAR). Information from the following report(s) Index, Intake/Output, Recent Results, Cardiac Rhythm SR and Quality Measures was reviewed with the receiving nurse. Lines:   Peripheral IV 06/08/22 Right Antecubital (Active)   Site Assessment Clean, dry & intact 06/10/22 1430   Line Status Infusing 06/10/22 1130   Line Care Connections checked and tightened 06/10/22 1430   Phlebitis Assessment No symptoms 06/10/22 1430   Infiltration Assessment 0 06/10/22 1430   Alcohol Cap Used No 06/10/22 1130   Dressing Status Clean, dry & intact 06/10/22 1430   Dressing Type Transparent 06/10/22 1430   Dressing Intervention Dressing changed 06/10/22 1130        Opportunity for questions and clarification was provided. Patient transported with:   O2 @ 2 liters  Tech    VTE prophylaxis orders have been written for [employer]. Patient and family given floor number and nurses name. Family updated re: pt status after security code verified.

## 2022-06-10 NOTE — ANESTHESIA POSTPROCEDURE EVALUATION
Department of Anesthesiology  Postprocedure Note    Patient: Quang Mauro  MRN: [id number]  YOB: 2000  Date of evaluation: 6/10/2022  Time:  2:39 PM     Procedure Summary     Date: 06/10/22 Room / Location: [facility] MAIN OR 09 / [facility] MAIN OR    Anesthesia Start: 1314 Anesthesia Stop: 1431    Procedure: CHOLECYSTECTOMY LAPAROSCOPIC/  (N/A Abdomen) Diagnosis:       Gallstone pancreatitis      (Gallstone pancreatitis [K85.10])    Providers: Vida Cortes MD Responsible Provider: Mike Pedersen MD    Anesthesia Type: general ASA Status: 2          Anesthesia Type: No value filed. Moses Phase I: Moses Score: 8    Moses Phase II:      Last vitals: Reviewed and per EMR flowsheets.        Anesthesia Post Evaluation    Patient location during evaluation: PACU  Patient participation: complete - patient participated  Level of consciousness: awake and alert  Airway patency: patent  Nausea & Vomiting: no nausea and no vomiting  Complications: no  Cardiovascular status: hemodynamically stable  Respiratory status: acceptable, nonlabored ventilation and spontaneous ventilation  Hydration status: euvolemic  Comments: /60   Pulse 68   Temp 97.9 °F (36.6 °C) (Tympanic)   Resp 16   Ht 5' 5\" (1.651 m)   Wt 193 lb (87.5 kg)   SpO2 98%   BMI 32.12 kg/m²     Multimodal analgesia pain management approach

## 2022-06-10 NOTE — ANESTHESIA PRE PROCEDURE
Department of Anesthesiology  Preprocedure Note       Name:  Senait Haas   Age:  23 y.o.  :  2000                                          MRN:  [id number]         Date:  2022      Surgeon: Georgette Anders):  Vasquez Wolff MD    Procedure: Procedure(s):  CHOLECYSTECTOMY LAPAROSCOPIC/     Medications prior to admission:   Prior to Admission medications    Not on File       Current medications:    Current Facility-Administered Medications   Medication Dose Route Frequency Provider Last Rate Last Admin    dextrose 5 % and 0.45 % NaCl with KCl 20 mEq infusion   IntraVENous Continuous Vasquez Wolff  mL/hr at 22 New Bag at 22    famotidine (PEPCID) 20 mg in sodium chloride (PF) 10 mL injection  20 mg IntraVENous Q12H Vasquez Wolff MD   20 mg at 22    sodium chloride flush 0.9 % injection 5-40 mL  5-40 mL IntraVENous 2 times per day Adriel Matthew MD   10 mL at 22    sodium chloride flush 0.9 % injection 5-40 mL  5-40 mL IntraVENous PRN Adriel Matthew MD        0.9 % sodium chloride infusion   IntraVENous PRN Adriel Matthew MD        enoxaparin (LOVENOX) injection 40 mg  40 mg SubCUTAneous Q24H Adriel Matthew MD   40 mg at 22    ondansetron (ZOFRAN-ODT) disintegrating tablet 4 mg  4 mg Oral Q8H PRN Adriel Matthew MD        Or    ondansetron (ZOFRAN) injection 4 mg  4 mg IntraVENous Q6H PRN Adriel Matthew MD        acetaminophen (TYLENOL) tablet 650 mg  650 mg Oral Q6H PRN Adriel Matthew MD        Or    acetaminophen (TYLENOL) suppository 650 mg  650 mg Rectal Q6H PRN Adriel Matthew MD        piperacillin-tazobactam (ZOSYN) 3,375 mg in sodium chloride 0.9 % 50 mL IVPB (mini-bag)  3,375 mg IntraVENous q8h Adriel Matthew MD 12.5 mL/hr at 22 3,375 mg at 22    HYDROmorphone HCl PF (DILAUDID) injection 0.5 mg  0.5 mg IntraVENous Q4H PRN Adriel Matthew MD Date    WBC 18.9 06/09/2022    RBC 3.95 06/09/2022    HGB 11.6 06/09/2022    HCT 35.3 06/09/2022    MCV 89.4 06/09/2022    RDW 13.1 06/09/2022     06/09/2022       CMP:   Lab Results   Component Value Date     06/09/2022    K 3.6 06/09/2022     06/09/2022    CO2 22 06/09/2022    BUN 10 06/09/2022    CREATININE 0.50 06/09/2022    GFRAA >60 06/09/2022    LABGLOM >60 06/09/2022    GLUCOSE 75 06/09/2022    PROT 6.5 06/09/2022    CALCIUM 8.5 06/09/2022    BILITOT 0.6 06/09/2022    ALKPHOS 125 06/09/2022    AST 38 06/09/2022     06/09/2022       POC Tests: No results for input(s): POCGLU, POCNA, POCK, POCCL, POCBUN, POCHEMO, POCHCT in the last 72 hours. Coags: No results found for: PROTIME, INR, APTT    HCG (If Applicable):   Lab Results   Component Value Date    PREGTESTUR Negative 06/08/2022        ABGs: No results found for: PHART, PO2ART, HBU9LAV, VTX1KNT, BEART, A6ADJGYM     Type & Screen (If Applicable):  No results found for: LABABO, LABRH    Drug/Infectious Status (If Applicable):  No results found for: HIV, HEPCAB    COVID-19 Screening (If Applicable): No results found for: COVID19        Anesthesia Evaluation  Patient summary reviewed  Airway: Mallampati: II  TM distance: >3 FB   Neck ROM: full  Mouth opening: > = 3 FB   Dental: normal exam         Pulmonary:Negative Pulmonary ROS and normal exam  breath sounds clear to auscultation                             Cardiovascular:  Exercise tolerance: good (>4 METS),       (-) past MI      Rhythm: regular  Rate: normal                    Neuro/Psych:   Negative Neuro/Psych ROS     (-) CVA           GI/Hepatic/Renal: Neg GI/Hepatic/Renal ROS           ROS comment: obesity. Endo/Other: Negative Endo/Other ROS                     ROS comment: 3 day h/o N/V when admitted, found to have Galstone pancreatitis, now s/p ERCP, cholecystectomy prior to discharge. Abdominal:             Vascular: negative vascular ROS.          Other Findings: Anesthesia Plan      general     ASA 2       Induction: intravenous. Anesthetic plan and risks discussed with patient.                         Steffanie Le MD   6/9/2022

## 2022-06-10 NOTE — ANESTHESIA PROCEDURE NOTES
Airway  Urgency: elective    Airway not difficult    General Information and Staff    Patient location during procedure: OR  Resident/CRNA: JOE Zee - JENNA    Indications and Patient Condition  Indications for airway management: anesthesia  Spontaneous ventilation: present  Sedation level: deep  Preoxygenated: yes  Patient position: sniffing  MILS maintained throughout  Mask difficulty assessment: vent by bag mask    Final Airway Details  Final airway type: endotracheal airway      Successful airway: ETT    Successful intubation technique: direct laryngoscopy  Endotracheal tube insertion site: oral  Blade: Yohana  Blade size: #4  ETT size (mm): 7.0  Cormack-Lehane Classification: grade I - full view of glottis  Placement verified by: chest auscultation and capnometry   Number of attempts at approach: 1    no

## 2022-06-10 NOTE — OP NOTE
[address] [facility]  [phone]    OPERATVE REPORT    Name: Ceci Diamond     Date of Surgery: 06/10/22    Med Record Number: [id number]   Age: 23 y.o. Sex: female   Pre-operative Diagnosis: Gallstone Pancreatitis  Post-operative Diagnosis: same  Procedure: Laparoscopic Cholecystectomy (CPT 51005)  Surgeon: Ramila Marin MD  Asistants: none  Anesthesia:  General  Complications: none  Specimen: gallbladder to path  Estimated Blood Loss: <30cc    Procedure Description:   The risks, benefits, potential complications, treatment options, and expected outcomes were discussed with the patient pre-operatively. The patient voiced understanding and gave informed consent preoperatively. The patient was taken to the Operating Room, and the [facility] time-out protocol checklist was followed. After the induction of adequate anesthesia, the abdomen was prepped and draped in the usual sterile fashion. Preoperative antibiotics were given. A sub umbilical incision was made and a cut down approach was used to place a blunt Jose trochar under direct vision. After adequate pneumoperitioneum, two 5mm static and dynamic retraction ports were placed and an 11mm trochar also placed, all in the usual locations. The right and transverse colon were markedly distended making exposure difficult. We almost placed an extra 5mm trochar and the liver retractor but did not. There was non-bilious serosanginous fluid lateral to the right lobe of the liver which was aspirated. The gallbladder was retracted and inflammatory adhesions to the inferior aspect of the gallbladder were taken down. Careful and tedious dissection was performed to free up the cystic duct and artery from the surrounding tissues. A clear window was created between the inferior aspect of the gallbladder wall, the cystic duct, and the cystic artery.   The cystic duct could clearly be seen to enter the gallbladder and the cystic artery seen to traverse on the gallbladder wall toward the fundus of the gallbladder. The critical view of safety was achieved. We placed 3 clips on the distal cystic duct (patient side) and 2 clips on the proximal (specimen side) of the cystic duct. We then placed 2 clips on the proximal cystic artery and 2 clips on the distal cystic artery. Both structures were then divided. The cystic artery could be seen to pulsate at its clipped end as usual.  The gallbladder was the removed from its attachments to the liver. Small venous tributaries were clipped as needed as dissection was carried up toward the gallbladder fundus. The gallbladder was retracted out through the umbilical trochar suite with the camera placed temporarily through the epigastric trochar site. The gallbladder fossa was inspected. No bile leaks were seen, the clips on the artery and duct were intact and hemostatsis confirmed. Marcaine was infiltrated into the preperitoneal tissues around each trochar site. The fascia of the umbilical incision was closed with interrupted 0 vicryl suture. Clips were placed to close the skin incisions. The wounds were dressed sterilely with telfa and tegaderm, All sponge, instruments, and needle counts were correct. The patient was taken to recovery in good condition.     Luann Sheth MD, FACS

## 2022-06-10 NOTE — PROGRESS NOTES
TRANSFER - OUT REPORT:    Verbal report given to MEJIA Choe on [employer]  being transferred to pre for routine progression of patient care       Report consisted of patient's Situation, Background, Assessment and   Recommendations(SBAR). Information from the following report(s) Nurse Handoff Report was reviewed with the receiving nurse. Lines:   Peripheral IV 06/08/22 Right Antecubital (Active)   Site Assessment Clean, dry & intact 06/10/22 0752   Line Status Infusing 06/10/22 0752   Line Care Connections checked and tightened 06/10/22 0752   Phlebitis Assessment No symptoms 06/10/22 0752   Infiltration Assessment 0 06/10/22 0752   Alcohol Cap Used No 06/10/22 0752   Dressing Status Clean, dry & intact 06/10/22 0752   Dressing Type Transparent 06/10/22 0752        Opportunity for questions and clarification was provided.       Patient transported with:  [employer]

## 2022-06-10 NOTE — PROGRESS NOTES
Hospitalist Progress Note   Admit Date:  2022  9:40 AM   Name:  Cal Chowdhury   Age:  23 y.o. Sex:  female  :  2000   MRN:  [id number]   Room:      Presenting Complaint: Abdominal Pain     Reason(s) for Admission: Choledocholithiasis [K80.50]  Choledocholithiasis with acute cholecystitis [K80.42]  Acute pancreatitis, unspecified complication status, unspecified pancreatitis type [facility] Course & Interval History:     Patient with past medical history of    Gallstones   BMI of 32    Patient came to hospital due to nausea, emesis, abdominal pain. She was found to have elevated lipase, and liver enzymes. Abdominal US shows cholelithiasis with findings consistent with chronic cholecystitis, dilated common bile duct and intrahepatic biliary ductal dilatation. Patient was admitted and started on Empiric IV antibiotics and patient was seen by GI service. Patient had ERCP and papillotomy. General surgery service is also consulted. Impression is that patient has had dislodgement of gallstones and ? CBD stones. Patient felt better and LFT shows improvement with less bilirubin and liver enzymes levels. General surgery plans for cholecystectomy before hospital discharge. Subjective/24hr Events (06/10/22):     22   Patient has no fever. No chest pain. No shortness of breath. Less abdominal pain. 6/10/22   Patient has mild abdominal discomfort. No fever. No shaking. No chills. No shortness of breath. No chest pain. Patient is for cholecystectomy today. Assessment & Plan:     Principal Problem:    Gallstone pancreatitis    Choledocholithiasis with acute cholecystitis    Leukocytosis    Elevated LFTs  Dilated CBD, acquired    Acute pancreatitis  Likely patient had dislodgement of stones. Will monitor. So far, no fever. Continue empiric Zosyn. Follow up on culture results. So far, culture results are negative. Continue IV fluid. Patient is on call for laparotomy cholecystectomy today. Active Problems:    Hyponatremia  Improved and resolved. Na is now 138      Obesity (BMI 30-39. 9)  Patient will be counseled for weight loss and maintenance of healthy weight when recovering from this acute illness. I have discussed the plan of care with patient. Discharge Planning:    Home when ready after surgery. Diet:  ADULT DIET; Clear Liquid  DVT PPx: Enoxaparin SC   Code status: Full Code    Hospital Problems:  Principal Problem:    Gallstone pancreatitis  Active Problems:    Choledocholithiasis with acute cholecystitis    Hyponatremia    Obesity (BMI 30-39. 9)    Leukocytosis    Elevated LFTs    Dilated cbd, acquired    Acute pancreatitis  Resolved Problems:    * No resolved hospital problems. *      Objective:     Patient Vitals for the past 24 hrs:   Temp Pulse Resp BP SpO2   06/10/22 0547   16     06/10/22 0325 98.1 °F (36.7 °C) 84 15 126/77 97 %   06/09/22 2334 98.4 °F (36.9 °C) 84 15 136/76 99 %   06/09/22 2001 98.4 °F (36.9 °C) 76 17 120/70 99 %   06/09/22 1613 99.1 °F (37.3 °C) 78 16 126/78 99 %   06/09/22 1140 98.8 °F (37.1 °C) 71 16 130/82 100 %       Oxygen Therapy  SpO2: 97 %  Pulse Oximetry Type: Continuous  Pulse Oximeter Device Mode: Continuous  Pulse Oximeter Device Location: Right  O2 Device: None (Room air)  O2 Flow Rate (L/min): 3 L/min    Estimated body mass index is 32.12 kg/m² as calculated from the following:    Height as of this encounter: 5' 5\" (1.651 m). Weight as of this encounter: 193 lb (87.5 kg). Intake/Output Summary (Last 24 hours) at 6/10/2022 0841  Last data filed at 6/10/2022 0406  Gross per 24 hour   Intake 300 ml   Output    Net 300 ml         Physical Exam:     Blood pressure 126/77, pulse 84, temperature 98.1 °F (36.7 °C), temperature source Oral, resp. rate 16, height 5' 5\" (1.651 m), weight 193 lb (87.5 kg), SpO2 97 %. General:    Well nourished.  Patient is lying in bed with head up and appears comfortable. Head:  Normocephalic, atraumatic, mildly pale and no icterus   Eyes:  Sclerae appear normal.  Pupils equally round. ENT:  Nares appear normal, no drainage. Moist oral mucosa  Neck:  No restricted ROM. Trachea midline   CV:   RRR. No m/r/g. No jugular venous distension. Lungs:   CTAB. No wheezing, rhonchi, or rales. Respirations even, unlabored  Abdomen: Bowel sounds present. Soft, nontender, nondistended. Extremities: No cyanosis or clubbing. No edema  Skin:     No rashes and normal coloration. Warm and dry. Neuro:  CN II-XII grossly intact. Sensation intact. A&Ox3  Psych:  Normal mood and affect.       I have reviewed ordered lab tests and independently visualized imaging below:    Recent Labs:  Recent Results (from the past 48 hour(s))   CBC with Diff    Collection Time: 06/08/22  9:38 AM   Result Value Ref Range    WBC 23.6 (H) 4.3 - 11.1 K/uL    RBC 4.94 4.05 - 5.2 M/uL    Hemoglobin 14.3 11.7 - 15.4 g/dL    Hematocrit 42.5 35.8 - 46.3 %    MCV 86.0 79.6 - 97.8 FL    MCH 28.9 26.1 - 32.9 PG    MCHC 33.6 31.4 - 35.0 g/dL    RDW 12.9 11.9 - 14.6 %    Platelets 651 917 - 919 K/uL    MPV 11.6 9.4 - 12.3 FL    nRBC 0.00 0.0 - 0.2 K/uL    Differential Type AUTOMATED      Seg Neutrophils 90 (H) 43 - 78 %    Lymphocytes 4 (L) 13 - 44 %    Monocytes 5 4.0 - 12.0 %    Eosinophils % 0 (L) 0.5 - 7.8 %    Basophils 0 0.0 - 2.0 %    Immature Granulocytes 1 0.0 - 5.0 %    Segs Absolute 21.2 (H) 1.7 - 8.2 K/UL    Absolute Lymph # 1.0 0.5 - 4.6 K/UL    Absolute Mono # 1.3 0.1 - 1.3 K/UL    Absolute Eos # 0.0 0.0 - 0.8 K/UL    Basophils Absolute 0.0 0.0 - 0.2 K/UL    Absolute Immature Granulocyte 0.1 0.0 - 0.5 K/UL   CMP    Collection Time: 06/08/22  9:38 AM   Result Value Ref Range    Sodium 134 (L) 136 - 145 mmol/L    Potassium 3.8 3.5 - 5.1 mmol/L    Chloride 101 98 - 107 mmol/L    CO2 27 21 - 32 mmol/L    Anion Gap 6 (L) 7 - 16 mmol/L    Glucose 107 (H) 65 - 100 mg/dL    BUN 8 6 - 23 MG/DL    CREATININE 0.60 0.6 - 1.0 MG/DL    GFR African American >60 >60 ml/min/1.73m2    GFR Non- >60 >60 ml/min/1.73m2    Calcium 9.3 8.3 - 10.4 MG/DL    Total Bilirubin 0.9 0.2 - 1.1 MG/DL     (H) 12 - 65 U/L    AST 87 (H) 15 - 37 U/L    Alk Phosphatase 166 (H) 50 - 136 U/L    Total Protein 7.8 6.3 - 8.2 g/dL    Albumin 4.1 3.5 - 5.0 g/dL    Globulin 3.7 (H) 2.3 - 3.5 g/dL    Albumin/Globulin Ratio 1.1 (L) 1.2 - 3.5     Lipase    Collection Time: 06/08/22  9:38 AM   Result Value Ref Range    Lipase 6,486 (H) 73 - 393 U/L   Lactic Acid    Collection Time: 06/08/22 10:51 AM   Result Value Ref Range    Lactic Acid, Plasma 1.2 0.4 - 2.0 MMOL/L   Culture, Blood 1    Collection Time: 06/08/22 10:51 AM    Specimen: Blood   Result Value Ref Range    Special Requests RIGHT  FOREARM        Culture NO GROWTH 2 DAYS     POCT Urinalysis no Micro    Collection Time: 06/08/22 10:57 AM   Result Value Ref Range    Specific Gravity, Urine, POC 1.025 (H) 1.001 - 1.023      pH, Urine, POC 6.0 5.0 - 9.0      Protein, Urine, POC TRACE (A) NEG mg/dL    Glucose, UA POC Negative NEG mg/dL    KETONES, Urine, POC >160 (A) NEG mg/dL    Bilirubin, Urine, POC SMALL (A) NEG      Blood, UA POC Trace Intact (A) NEG      URINE UROBILINOGEN POC 0.2 0.2 - 1.0 EU/dL    Nitrate, Urine, POC Negative NEG      Leukocyte Est, UA POC Negative NEG      Performed by: Odessa Cherelle    POC Pregnancy Urine Qual    Collection Time: 06/08/22 10:58 AM   Result Value Ref Range    Preg Test, Ur Negative NEG     Blood Culture 2    Collection Time: 06/08/22  7:12 PM    Specimen: Blood   Result Value Ref Range    Special Requests RIGHT  HAND        Culture NO GROWTH 2 DAYS     Comprehensive Metabolic Panel w/ Reflex to MG    Collection Time: 06/09/22  3:27 AM   Result Value Ref Range    Sodium 140 136 - 145 mmol/L    Potassium 3.6 3.5 - 5.1 mmol/L    Chloride 107 98 - 107 mmol/L    CO2 22 21 - 32 mmol/L    Anion Gap 11 7 - 16 mmol/L Glucose 75 65 - 100 mg/dL    BUN 10 6 - 23 MG/DL    CREATININE 0.50 (L) 0.6 - 1.0 MG/DL    GFR African American >60 >60 ml/min/1.73m2    GFR Non- >60 >60 ml/min/1.73m2    Calcium 8.5 8.3 - 10.4 MG/DL    Total Bilirubin 0.6 0.2 - 1.1 MG/DL     (H) 12 - 65 U/L    AST 38 (H) 15 - 37 U/L    Alk Phosphatase 125 50 - 136 U/L    Total Protein 6.5 6.3 - 8.2 g/dL    Albumin 3.1 (L) 3.5 - 5.0 g/dL    Globulin 3.4 2.3 - 3.5 g/dL    Albumin/Globulin Ratio 0.9 (L) 1.2 - 3.5     CBC with Auto Differential    Collection Time: 06/09/22  3:27 AM   Result Value Ref Range    WBC 18.9 (H) 4.3 - 11.1 K/uL    RBC 3.95 (L) 4.05 - 5.2 M/uL    Hemoglobin 11.6 (L) 11.7 - 15.4 g/dL    Hematocrit 35.3 (L) 35.8 - 46.3 %    MCV 89.4 79.6 - 97.8 FL    MCH 29.4 26.1 - 32.9 PG    MCHC 32.9 31.4 - 35.0 g/dL    RDW 13.1 11.9 - 14.6 %    Platelets 013 096 - 885 K/uL    MPV 12.1 9.4 - 12.3 FL    nRBC 0.00 0.0 - 0.2 K/uL    Differential Type AUTOMATED      Seg Neutrophils 89 (H) 43 - 78 %    Lymphocytes 5 (L) 13 - 44 %    Monocytes 6 4.0 - 12.0 %    Eosinophils % 0 (L) 0.5 - 7.8 %    Basophils 0 0.0 - 2.0 %    Immature Granulocytes 1 0.0 - 5.0 %    Segs Absolute 16.8 (H) 1.7 - 8.2 K/UL    Absolute Lymph # 0.9 0.5 - 4.6 K/UL    Absolute Mono # 1.1 0.1 - 1.3 K/UL    Absolute Eos # 0.0 0.0 - 0.8 K/UL    Basophils Absolute 0.0 0.0 - 0.2 K/UL    Absolute Immature Granulocyte 0.1 0.0 - 0.5 K/UL   Lipase    Collection Time: 06/09/22  3:27 AM   Result Value Ref Range    Lipase 1,945 (H) 73 - 393 U/L   CBC with Auto Differential    Collection Time: 06/10/22  5:41 AM   Result Value Ref Range    WBC 14.7 (H) 4.3 - 11.1 K/uL    RBC 3.90 (L) 4.05 - 5.2 M/uL    Hemoglobin 11.7 11.7 - 15.4 g/dL    Hematocrit 34.8 (L) 35.8 - 46.3 %    MCV 89.2 79.6 - 97.8 FL    MCH 30.0 26.1 - 32.9 PG    MCHC 33.6 31.4 - 35.0 g/dL    RDW 12.8 11.9 - 14.6 %    Platelets 778 850 - 116 K/uL    MPV 12.0 9.4 - 12.3 FL    nRBC 0.00 0.0 - 0.2 K/uL    Differential Type AUTOMATED      Seg Neutrophils 79 (H) 43 - 78 %    Lymphocytes 10 (L) 13 - 44 %    Monocytes 8 4.0 - 12.0 %    Eosinophils % 3 0.5 - 7.8 %    Basophils 0 0.0 - 2.0 %    Immature Granulocytes 0 0.0 - 5.0 %    Segs Absolute 11.6 (H) 1.7 - 8.2 K/UL    Absolute Lymph # 1.4 0.5 - 4.6 K/UL    Absolute Mono # 1.2 0.1 - 1.3 K/UL    Absolute Eos # 0.4 0.0 - 0.8 K/UL    Basophils Absolute 0.0 0.0 - 0.2 K/UL    Absolute Immature Granulocyte 0.1 0.0 - 0.5 K/UL   Comprehensive Metabolic Panel    Collection Time: 06/10/22  5:41 AM   Result Value Ref Range    Sodium 138 136 - 145 mmol/L    Potassium 3.6 3.5 - 5.1 mmol/L    Chloride 106 98 - 107 mmol/L    CO2 27 21 - 32 mmol/L    Anion Gap 5 (L) 7 - 16 mmol/L    Glucose 96 65 - 100 mg/dL    BUN 4 (L) 6 - 23 MG/DL    CREATININE 0.50 (L) 0.6 - 1.0 MG/DL    GFR African American >60 >60 ml/min/1.73m2    GFR Non- >60 >60 ml/min/1.73m2    Calcium 8.4 8.3 - 10.4 MG/DL    Total Bilirubin 0.6 0.2 - 1.1 MG/DL     (H) 12 - 65 U/L    AST 17 15 - 37 U/L    Alk Phosphatase 111 50 - 136 U/L    Total Protein 6.2 (L) 6.3 - 8.2 g/dL    Albumin 2.9 (L) 3.5 - 5.0 g/dL    Globulin 3.3 2.3 - 3.5 g/dL    Albumin/Globulin Ratio 0.9 (L) 1.2 - 3.5     Lipase    Collection Time: 06/10/22  5:41 AM   Result Value Ref Range    Lipase 533 (H) 73 - 393 U/L       Other Studies:  FL ERCP BILIARY AND PANCREATIC S&I   Final Result   Status post ERCP without residual filling defect status post balloon   sweep. US ABDOMEN LIMITED Specify organ? GALLBLADDER   Final Result   1. Cholelithiasis with mild gallbladder wall thickening raising the possibility   of acute or chronic cholecystitis. 2. Dilated common bile duct as well as intrahepatic biliary ductal dilatation. A   distal stone or stricture could account for this appearance. An MRCP or ERCP may   be beneficial there is further clinical concern.              Current Meds:  Current Facility-Administered Medications   Medication Dose Route Frequency    dextrose 5 % and 0.45 % NaCl with KCl 20 mEq infusion   IntraVENous Continuous    famotidine (PEPCID) 20 mg in sodium chloride (PF) 10 mL injection  20 mg IntraVENous Q12H    sodium chloride flush 0.9 % injection 5-40 mL  5-40 mL IntraVENous 2 times per day    sodium chloride flush 0.9 % injection 5-40 mL  5-40 mL IntraVENous PRN    0.9 % sodium chloride infusion   IntraVENous PRN    enoxaparin (LOVENOX) injection 40 mg  40 mg SubCUTAneous Q24H    ondansetron (ZOFRAN-ODT) disintegrating tablet 4 mg  4 mg Oral Q8H PRN    Or    ondansetron (ZOFRAN) injection 4 mg  4 mg IntraVENous Q6H PRN    acetaminophen (TYLENOL) tablet 650 mg  650 mg Oral Q6H PRN    Or    acetaminophen (TYLENOL) suppository 650 mg  650 mg Rectal Q6H PRN    piperacillin-tazobactam (ZOSYN) 3,375 mg in sodium chloride 0.9 % 50 mL IVPB (mini-bag)  3,375 mg IntraVENous q8h    HYDROmorphone HCl PF (DILAUDID) injection 0.5 mg  0.5 mg IntraVENous Q4H PRN    oxyCODONE (ROXICODONE) immediate release tablet 5 mg  5 mg Oral Q6H PRN    glucagon (rDNA) injection 1 mg  1 mg IntraVENous Once       Signed:  Li Foote MD    Part of this note may have been written by using a voice dictation software. The note has been proof read but may still contain some grammatical/other typographical errors.

## 2022-06-11 VITALS
HEIGHT: 65 IN | OXYGEN SATURATION: 98 % | HEART RATE: 57 BPM | RESPIRATION RATE: 16 BRPM | DIASTOLIC BLOOD PRESSURE: 77 MMHG | BODY MASS INDEX: 32.15 KG/M2 | SYSTOLIC BLOOD PRESSURE: 136 MMHG | WEIGHT: 193 LBS | TEMPERATURE: 97.6 F

## 2022-06-11 LAB
ALBUMIN SERPL-MCNC: 2.9 G/DL (ref 3.5–5)
ALBUMIN/GLOB SERPL: 0.7 {RATIO} (ref 1.2–3.5)
ALP SERPL-CCNC: 133 U/L (ref 50–136)
ALT SERPL-CCNC: 120 U/L (ref 12–65)
ANION GAP SERPL CALC-SCNC: 7 MMOL/L (ref 7–16)
AST SERPL-CCNC: 25 U/L (ref 15–37)
BASOPHILS # BLD: 0 K/UL (ref 0–0.2)
BASOPHILS NFR BLD: 0 % (ref 0–2)
BILIRUB SERPL-MCNC: 0.5 MG/DL (ref 0.2–1.1)
BUN SERPL-MCNC: 4 MG/DL (ref 6–23)
CALCIUM SERPL-MCNC: 9.7 MG/DL (ref 8.3–10.4)
CHLORIDE SERPL-SCNC: 105 MMOL/L (ref 98–107)
CO2 SERPL-SCNC: 26 MMOL/L (ref 21–32)
CREAT SERPL-MCNC: 0.4 MG/DL (ref 0.6–1)
DIFFERENTIAL METHOD BLD: ABNORMAL
EOSINOPHIL # BLD: 0 K/UL (ref 0–0.8)
EOSINOPHIL NFR BLD: 0 % (ref 0.5–7.8)
ERYTHROCYTE [DISTWIDTH] IN BLOOD BY AUTOMATED COUNT: 12.6 % (ref 11.9–14.6)
GLOBULIN SER CALC-MCNC: 3.9 G/DL (ref 2.3–3.5)
GLUCOSE SERPL-MCNC: 107 MG/DL (ref 65–100)
HCT VFR BLD AUTO: 37 % (ref 35.8–46.3)
HGB BLD-MCNC: 12.1 G/DL (ref 11.7–15.4)
IMM GRANULOCYTES # BLD AUTO: 0.1 K/UL (ref 0–0.5)
IMM GRANULOCYTES NFR BLD AUTO: 0 % (ref 0–5)
LYMPHOCYTES # BLD: 0.7 K/UL (ref 0.5–4.6)
LYMPHOCYTES NFR BLD: 6 % (ref 13–44)
MCH RBC QN AUTO: 29.4 PG (ref 26.1–32.9)
MCHC RBC AUTO-ENTMCNC: 32.7 G/DL (ref 31.4–35)
MCV RBC AUTO: 90 FL (ref 79.6–97.8)
MONOCYTES # BLD: 0.7 K/UL (ref 0.1–1.3)
MONOCYTES NFR BLD: 6 % (ref 4–12)
NEUTS SEG # BLD: 11 K/UL (ref 1.7–8.2)
NEUTS SEG NFR BLD: 88 % (ref 43–78)
NRBC # BLD: 0 K/UL (ref 0–0.2)
PLATELET # BLD AUTO: 192 K/UL (ref 150–450)
PMV BLD AUTO: 12.4 FL (ref 9.4–12.3)
POTASSIUM SERPL-SCNC: 3.9 MMOL/L (ref 3.5–5.1)
PROT SERPL-MCNC: 6.8 G/DL (ref 6.3–8.2)
RBC # BLD AUTO: 4.11 M/UL (ref 4.05–5.2)
SODIUM SERPL-SCNC: 138 MMOL/L (ref 136–145)
WBC # BLD AUTO: 12.4 K/UL (ref 4.3–11.1)

## 2022-06-11 PROCEDURE — 6370000000 HC RX 637 (ALT 250 FOR IP): Performed by: SURGERY

## 2022-06-11 PROCEDURE — 80053 COMPREHEN METABOLIC PANEL: CPT

## 2022-06-11 PROCEDURE — 2580000003 HC RX 258: Performed by: SURGERY

## 2022-06-11 PROCEDURE — 6360000002 HC RX W HCPCS: Performed by: SURGERY

## 2022-06-11 PROCEDURE — 85025 COMPLETE CBC W/AUTO DIFF WBC: CPT

## 2022-06-11 PROCEDURE — 36415 COLL VENOUS BLD VENIPUNCTURE: CPT

## 2022-06-11 RX ADMIN — PIPERACILLIN AND TAZOBACTAM 3375 MG: 3; .375 INJECTION, POWDER, LYOPHILIZED, FOR SOLUTION INTRAVENOUS at 10:07

## 2022-06-11 RX ADMIN — OXYCODONE 5 MG: 5 TABLET ORAL at 10:19

## 2022-06-11 RX ADMIN — FAMOTIDINE 20 MG: 20 TABLET, FILM COATED ORAL at 08:22

## 2022-06-11 RX ADMIN — PIPERACILLIN AND TAZOBACTAM 3375 MG: 3; .375 INJECTION, POWDER, LYOPHILIZED, FOR SOLUTION INTRAVENOUS at 01:48

## 2022-06-11 ASSESSMENT — PAIN SCALES - GENERAL
PAINLEVEL_OUTOF10: 0
PAINLEVEL_OUTOF10: 4

## 2022-06-11 ASSESSMENT — PAIN DESCRIPTION - LOCATION: LOCATION: ABDOMEN

## 2022-06-11 ASSESSMENT — PAIN DESCRIPTION - DESCRIPTORS: DESCRIPTORS: ACHING

## 2022-06-11 ASSESSMENT — PAIN DESCRIPTION - ORIENTATION: ORIENTATION: ANTERIOR

## 2022-06-11 NOTE — PROGRESS NOTES
The patient was hospitalized from 6/8/2022 until 6/11/2022. Per Dr. Samara Meléndez, Pt is ONLY REQUIRED TO DO VERY LIGHT DUTY. Pt is not allowed to lift over 5 LBs for six weeks. Any questions please call his office at [phone]. Thank you.

## 2022-06-11 NOTE — CARE COORDINATION
Pt is for discharge home today with family and no needs/supportive care orders recieved for CM at this time. Pt is a student and returns home with her parents to provide any needed support. [facility] to follow up for any financial assistance available to pt.     06/11/22 1300   Discharge Planning   Type of Residence House   Living Arrangements Family Members  (parents)   Current Services Prior To Admission None   DME Ordered?  No   Patient expects to be discharged to: [facility]   Services At/After Discharge None   Mode of Transport at Discharge Other (see comment)  (family)   Confirm Follow Up Transport Self

## 2022-06-11 NOTE — PROGRESS NOTES
H&P/Consult Note/Progress Note/Office Note:   Armando Macdonald  MRN: [id number]  JAO:[birth year]  Age:22 y.o.    HPI: Armando Macdonald is a 23 y.o. female who came to the ER and was admitted by the Hospitalists with a  3 day h/o progressive N/V/upper abd pain/epigastric pain. Nothing made pain better or worse. Pain 5/10 on arrival   No associated fever. She has been known to have gallstones for 3 yrs but trying homeopathic treatment instead of surgery    In ER WBC 23.6k, T Bili 0.9, AST/ALT 87/383, alk phosp 166  Lipase 6486    GI and General Surgery were consulted by MUNA Weldon  She had ERCP on day of admission  6/8/22 s/p ERCP; Dr Frank Loredo  CBD 15mm, minimal sludge and likely a prior passed stone; pancreas normal, GB not seen        6/8/22 RUQ Abd US  Homogeneous echotexture throughout the liver. There is mild    intrahepatic biliary ductal dilatation. There is no focal hepatic mass. Gallstones are present within the gallbladder lumen. There is gallbladder wall   thickening measuring 5 mm. No pericholecystic fluid. There was a   reported negative sonographic Jackson's sign. CBD dilated measuring 14mm. Visualized portions of the pancreas were unremarkable however the tail was   obscured by overlying bowel gas. The right kidney is unremarkable without   hydronephrosis or solid mass. The right kidney was measured at approximately 11.1cm. The visualized portions of the abdominal aorta and inferior vena cava are   unremarkable. There is normal hepatopetal flow within the main portal vein. No   free fluid is seen within the right upper quadrant. mpression   1. Cholelithiasis with mild gallbladder wall thickening raising the possibility   of acute or chronic cholecystitis. 2. Dilated common bile duct as well as intrahepatic biliary ductal dilatation. A   distal stone or stricture could account for this appearance.  An MRCP or ERCP may   be beneficial there is further clinical concern. Additonal hx:  6/10/22 Feels better; some epigastric discomfort but much improved compared to admission, doesn't feel distended; WBC lower at 23.6k-->14.7k, T Bili normal; Lipase 533; Proceed with lap annamaria today    6/11/22:Pt awake in bed. Tolerating Clear Liquids. Feels good and wants to go home. WBC improving. Down to 15. 4.AF, NAD. History reviewed. No pertinent past medical history. Past Surgical History:   Procedure Laterality Date    ERCP N/A 6/8/2022    ERCP SPHINCTER/PAPILLOTOMY with balloon sweep performed by JEMMA Hernandez MD at [facility] ENDOSCOPY    ERCP W/ SPHICTEROTOMY N/A 06/08/2022     Current Facility-Administered Medications   Medication Dose Route Frequency    dextrose 5 % and 0.45 % NaCl with KCl 20 mEq infusion   IntraVENous Continuous    enoxaparin (LOVENOX) injection 40 mg  40 mg SubCUTAneous Q24H    famotidine (PEPCID) tablet 20 mg  20 mg Oral BID    ondansetron (ZOFRAN-ODT) disintegrating tablet 4 mg  4 mg Oral Q8H PRN    Or    ondansetron (ZOFRAN) injection 4 mg  4 mg IntraVENous Q6H PRN    acetaminophen (TYLENOL) tablet 650 mg  650 mg Oral Q6H PRN    piperacillin-tazobactam (ZOSYN) 3,375 mg in sodium chloride 0.9 % 50 mL IVPB (mini-bag)  3,375 mg IntraVENous q8h    HYDROmorphone HCl PF (DILAUDID) injection 0.5 mg  0.5 mg IntraVENous Q4H PRN    oxyCODONE (ROXICODONE) immediate release tablet 5 mg  5 mg Oral Q6H PRN    glucagon (rDNA) injection 1 mg  1 mg IntraVENous Once     Patient has no known allergies.   Social History     Socioeconomic History    Marital status: Single     Spouse name: None    Number of children: None    Years of education: None    Highest education level: None   Occupational History    None   Tobacco Use    Smoking status: Never Smoker    Smokeless tobacco: Never Used   Substance and Sexual Activity    Alcohol use: None    Drug use: None    Sexual activity: None   Other Topics Concern    None   Social History Narrative    None     Social Determinants of Health     Financial Resource Strain:     Difficulty of Paying Living Expenses: Not on file   Food Insecurity:     Worried About Running Out of Food in the Last Year: Not on file    Dm of Food in the Last Year: Not on file   Transportation Needs:     Lack of Transportation (Medical): Not on file    Lack of Transportation (Non-Medical): Not on file   Physical Activity:     Days of Exercise per Week: Not on file    Minutes of Exercise per Session: Not on file   Stress:     Feeling of Stress : Not on file   Social Connections:     Frequency of Communication with Friends and Family: Not on file    Frequency of Social Gatherings with Friends and Family: Not on file    Attends Baptism Services: Not on file    Active Member of [address] [place] [address] [employer] or Organizations: Not on file    Attends Club or Organization Meetings: Not on file    Marital Status: Not on file   Intimate Partner Violence:     Fear of Current or Ex-Partner: Not on file    Emotionally Abused: Not on file    Physically Abused: Not on file    Sexually Abused: Not on file   Housing Stability:     Unable to Pay for Housing in the Last Year: Not on file    Number of [place] in the Last Year: Not on file    Unstable Housing in the Last Year: Not on file     Social History     Tobacco Use   Smoking Status Never Smoker   Smokeless Tobacco Never Used     History reviewed. No pertinent family history. ROS: The patient has no difficulty with chest pain or shortness of breath. No fever or chills. Comprehensive review of systems was otherwise unremarkable except as noted above.     Physical Exam:   /77   Pulse 57   Temp 97.6 °F (36.4 °C) (Oral)   Resp 16   Ht 5' 5\" (1.651 m)   Wt 193 lb (87.5 kg)   SpO2 98%   BMI 32.12 kg/m²   Vitals:    06/10/22 1535 06/10/22 1925 06/10/22 2305 06/11/22 0519   BP: 125/69 (!) 161/71 119/66 136/77   Pulse: 67 70 63 57   Resp: 16 16 16 16   Temp: 98.1 °F (36.7 °C) 97.3 °F (36.3 °C) 98.2 °F (36.8 °C) 97.6 °F (36.4 °C)   TempSrc: Oral Oral  Oral   SpO2: 94% 98% 94% 98%   Weight:       Height:         No intake/output data recorded. 06/09 1901 - 06/11 0700  In: 1600 [P.O.:600; I.V.:1000]  Out: 10     Constitutional: Alert, oriented, cooperative patient in no acute distress; appears stated age    Eyes: Sclera are clear. EOMs intact  ENMT: no external lesions gross hearing normal; no obvious neck masses, no ear or lip lesions, nares normal  CV: RRR. Normal perfusion  Resp: No JVD. Breathing is  non-labored; no audible wheezing. GI: soft and non-distended; appropriately ttp   Musculoskeletal: unremarkable with normal function. No embolic signs or cyanosis. Neuro:  Oriented; moves all 4; no focal deficits  Psychiatric: normal affect and mood, no memory impairment    Recent vitals (if inpt):  Patient Vitals for the past 24 hrs:   BP Temp Temp src Pulse Resp SpO2   06/11/22 0519 136/77 97.6 °F (36.4 °C) Oral 57 16 98 %   06/10/22 2305 119/66 98.2 °F (36.8 °C)  63 16 94 %   06/10/22 1925 (!) 161/71 97.3 °F (36.3 °C) Oral 70 16 98 %   06/10/22 1535 125/69 98.1 °F (36.7 °C) Oral 67 16 94 %   06/10/22 1502    65 16 98 %   06/10/22 1500 (!) 117/59 97.4 °F (36.3 °C) Temporal 63 16 98 %   06/10/22 1455 (!) 117/56   63 16 98 %   06/10/22 1453     16    06/10/22 1450 (!) 117/59   66 16 99 %   06/10/22 1445 (!) 120/59   68 16 97 %   06/10/22 1440 121/61   68 16 98 %   06/10/22 1433 121/60   68 16 98 %   06/10/22 1430 118/63 97.9 °F (36.6 °C) Tympanic 79 15 99 %       Amount and/or Complexity of Data Reviewed and Analyzed:  I reviewed and analyzed all of the unique labs and radiologic studies that are shown below as well as any that are in the HPI, and any that are in the expanded problem list below  *Each unique test, order, or document contributes to the combination of 2 or combination of 3 in Category 1 below.   For this visit I also reviewed old records and prior dilated  measuring 14 mm. The visualized portions of the pancreas were unremarkable however the tail was  obscured by overlying bowel gas. The right kidney is unremarkable without  hydronephrosis or solid mass. The right kidney was measured at approximately  11.1 cm. The visualized portions of the abdominal aorta and inferior vena cava are  unremarkable. There is normal hepatopetal flow within the main portal vein. No  free fluid is seen within the right upper quadrant. Impression  1. Cholelithiasis with mild gallbladder wall thickening raising the possibility  of acute or chronic cholecystitis. 2. Dilated common bile duct as well as intrahepatic biliary ductal dilatation. A  distal stone or stricture could account for this appearance. An MRCP or ERCP may  be beneficial there is further clinical concern. Admission date (for inpatients): 6/8/2022   Day of Surgery  Procedure(s):  ERCP ENDOSCOPIC RETROGRADE CHOLANGIOPANCREATOGRAPHY  ER 1        ASSESSMENT/PLAN:  [unfilled]  Principal Problem:    Gallstone pancreatitis  Active Problems:    Choledocholithiasis with acute cholecystitis    Hyponatremia    Obesity (BMI 30-39. 9)    Elevated LFTs    Dilated cbd, acquired  Resolved Problems:    Leukocytosis     Patient Active Problem List    Diagnosis Date Noted    Gallstone pancreatitis 06/09/2022     Priority: Medium     6/8/22 s/p ERCP; Dr Scot Swann  6/10/22 s/p lap cholecystectomy; Dr Sanjeev Salamanca Elevated LFTs 06/09/2022     Priority: Medium    Dilated cbd, acquired 06/09/2022     Priority: Medium    Choledocholithiasis with acute cholecystitis 06/08/2022     Priority: Medium    Hyponatremia 06/08/2022     Priority: Medium    Obesity (BMI 30-39.9) 06/08/2022     Priority: Medium          Number and Complexity of Problems addressed and   Risks of complications and/or morbidity of management          Acute gallstone pancreatitis with biliary obstruction  Admit inpt  WBC 23.6k--->18.9k-->14.7k-->12.4  Lipase 533    s/p ERCP on 6/8/22 with 15mm CBD with sludge; likely passed stone  NPO/IVF/IV Abx    Laparoscopic cholecystectomy 6/10/22    Pt tolerates liquids. Will DC 6/11/2022            Level of MDM (2/3 elements below)  Number and Complexity of Problems Addressed Amount and/or Complexity of Data to be Reviewed and Analyzed  *Each unique test, order, or document contributes to the combination of 2 or combination of 3 in Category 1 below.  Risk of Complications and/or Morbidity or Mortality of pt Management     15757  34374 SF Minimal  1self-limited or minor problem Minimal or none Minimal risk of morbidity from additional diagnostic testing or Rx   98182  41308 Low Low  2or more self-limited or minor problems;    or  1stable chronic illness;    or  [id number], uncomplicated illness or injury   Limited  (Must meet the requirements of at least 1 of the 2 categories)  Category 1: Tests and documents   Any combination of 2 from the following:  Review of prior external note(s) from each unique source*;  review of the result(s) of each unique test*;   ordering of each unique test*    or   Category 2: Assessment requiring an independent historian(s)  (For the categories of independent interpretation of tests and discussion of management or test interpretation, see moderate or high) Low risk of morbidity from additional diagnostic testing or treatment     06873  12174 Mod Moderate  1or more chronic illnesses with exacerbation, progression, or side effects of treatment;    or  2or more stable chronic illnesses;    or  1undiagnosed new problem with uncertain prognosis;    or  1acute illness with systemic symptoms;    or  [id number] complicated injury   Moderate  (Must meet the requirements of at least 1 out of 3 categories)  Category 1: Tests, documents, or independent historian(s)  Any combination of 3 from the following:   Review of prior external note(s) from each unique source*;  Review of the result(s) of each unique test*;  Ordering of each unique test*;  Assessment requiring an independent historian(s)    or  Category 2: Independent interpretation of tests   Independent interpretation of a test performed by another physician/other qualified health care professional (not separately reported);     or  Category 3: Discussion of management or test interpretation  Discussion of management or test interpretation with external physician/other qualified health care professional/appropriate source (not separately reported)   Moderate risk of morbidity from additional diagnostic testing or treatment  Examples only:  Prescription drug management   Decision regarding minor surgery with identified patient or procedure risk factors  Decision regarding elective major surgery without identified patient or procedure risk factors   Diagnosis or treatment significantly limited by social determinants of health       10496  04892 High High  1or more chronic illnesses with severe exacerbation, progression, or side effects of treatment;    or  1 acute or chronic illness or injury that poses a threat to life or bodily function   Extensive  (Must meet the requirements of at least 2 out of 3 categories)  Category 1: Tests, documents, or independent historian(s)  Any combination of 3 from the following:   Review of prior external note(s) from each unique source*;  Review of the result(s) of each unique test*;   Ordering of each unique test*;   Assessment requiring an independent historian(s)    or   Category 2: Independent interpretation of tests   Independent interpretation of a test performed by another physician/other qualified health care professional (not separately reported);     or  Category 3: Discussion of management or test interpretation  Discussion of management or test interpretation with external physician/other qualified health care professional/appropriate source (not separately reported)   High risk of morbidity from additional diagnostic testing or treatment  Examples only:  Drug therapy requiring intensive monitoring for toxicity  Decision regarding elective major surgery with identified patient or procedure risk factors  Decision regarding emergency major surgery  Decision regarding hospitalization  Decision not to resuscitate or to de-escalate care because of poor prognosis             I have personally performed a face-to-face diagnostic evaluation and management  service on this patient. I have independently seen the patient. I have independently obtained the above history from the patient/family. I have independently examined the patient with above findings. I have independently reviewed data/labs for this patient and developed the above plan of care (MDM).       Signed: SUDHA Gil  6/11/2022    12:15 PM

## 2022-06-11 NOTE — PROGRESS NOTES
Hospitalist Progress Note   Admit Date:  2022  9:40 AM   Name:  Enmanuel Darby   Age:  23 y.o. Sex:  female  :  2000   MRN:  [id number]   Room:      Presenting Complaint: Abdominal Pain     Reason(s) for Admission: Choledocholithiasis [K80.50]  Choledocholithiasis with acute cholecystitis [K80.42]  Acute pancreatitis, unspecified complication status, unspecified pancreatitis type [facility] Course & Interval History:     Patient with past medical history of    Gallstones   BMI of 32    Patient came to hospital due to nausea, emesis, abdominal pain. She was found to have elevated lipase, and liver enzymes. Abdominal US shows cholelithiasis with findings consistent with chronic cholecystitis, dilated common bile duct and intrahepatic biliary ductal dilatation. Patient was admitted and started on Empiric IV antibiotics and patient was seen by GI service. Patient had ERCP and papillotomy. General surgery service is also consulted. Impression is that patient has had dislodgement of gallstones and ? CBD stones. Patient felt better and LFT shows improvement with less bilirubin and liver enzymes levels. General surgery plans for cholecystectomy before hospital discharge. Subjective/24hr Events (22):     22   Patient has no fever. No chest pain. No shortness of breath. Less abdominal pain. 6/10/22   Patient has mild abdominal discomfort. No fever. No shaking. No chills. No shortness of breath. No chest pain. Patient is for cholecystectomy today. 22   Patient had laparoscopic cholecystectomy on Cherelle 10, 2022. She reports feeling \"OK\" some soreness on abdomen at wound. No fever. No shaking. No chills. Tolerating oral intake.        Assessment & Plan:     Principal Problem:    Gallstone pancreatitis    Choledocholithiasis with acute cholecystitis    Leukocytosis    Elevated LFTs  Dilated CBD, acquired    Acute pancreatitis  Likely patient had dislodgement of stones. So far, no fever. Continue empiric Zosyn. Follow up on culture results. So far, culture results are negative. Continue IV fluid. Post-op care after laparotomy cholecystectomy. Further plan as per surgery service. Active Problems:    Hyponatremia  Improved and resolved. Na is now 138      Obesity (BMI 30-39. 9)  Patient will be counseled for weight loss and maintenance of healthy weight when recovering from this acute illness. I have discussed the plan of care with patient. Discharge Planning:    Home when ready after surgery. Diet:  ADULT DIET; Clear Liquid  DVT PPx: Enoxaparin SC   Code status: Full Code    Hospital Problems:  Principal Problem:    Gallstone pancreatitis  Active Problems:    Choledocholithiasis with acute cholecystitis    Hyponatremia    Obesity (BMI 30-39. 9)    Elevated LFTs    Dilated cbd, acquired  Resolved Problems:    Leukocytosis      Objective:     Patient Vitals for the past 24 hrs:   Temp Pulse Resp BP SpO2   06/11/22 0519 97.6 °F (36.4 °C) 57 16 136/77 98 %   06/10/22 2305 98.2 °F (36.8 °C) 63 16 119/66 94 %   06/10/22 1925 97.3 °F (36.3 °C) 70 16 (!) 161/71 98 %   06/10/22 1535 98.1 °F (36.7 °C) 67 16 125/69 94 %   06/10/22 1502  65 16  98 %   06/10/22 1500 97.4 °F (36.3 °C) 63 16 (!) 117/59 98 %   06/10/22 1455  63 16 (!) 117/56 98 %   06/10/22 1453   16     06/10/22 1450  66 16 (!) 117/59 99 %   06/10/22 1445  68 16 (!) 120/59 97 %   06/10/22 1440  68 16 121/61 98 %   06/10/22 1433  68 16 121/60 98 %   06/10/22 1430 97.9 °F (36.6 °C) 79 15 118/63 99 %   06/10/22 1136 99.2 °F (37.3 °C) 90 16  97 %       Oxygen Therapy  SpO2: 98 %  Pulse Oximetry Type: Continuous  Pulse via Oximetry: 69 beats per minute  Pulse Oximeter Device Mode: Continuous  Pulse Oximeter Device Location: Right  O2 Device: None (Room air)  O2 Flow Rate (L/min): 3 L/min    Estimated body mass index is 32.12 kg/m² as 12.0 %    Eosinophils % 3 0.5 - 7.8 %    Basophils 0 0.0 - 2.0 %    Immature Granulocytes 0 0.0 - 5.0 %    Segs Absolute 11.6 (H) 1.7 - 8.2 K/UL    Absolute Lymph # 1.4 0.5 - 4.6 K/UL    Absolute Mono # 1.2 0.1 - 1.3 K/UL    Absolute Eos # 0.4 0.0 - 0.8 K/UL    Basophils Absolute 0.0 0.0 - 0.2 K/UL    Absolute Immature Granulocyte 0.1 0.0 - 0.5 K/UL   Comprehensive Metabolic Panel    Collection Time: 06/10/22  5:41 AM   Result Value Ref Range    Sodium 138 136 - 145 mmol/L    Potassium 3.6 3.5 - 5.1 mmol/L    Chloride 106 98 - 107 mmol/L    CO2 27 21 - 32 mmol/L    Anion Gap 5 (L) 7 - 16 mmol/L    Glucose 96 65 - 100 mg/dL    BUN 4 (L) 6 - 23 MG/DL    CREATININE 0.50 (L) 0.6 - 1.0 MG/DL    GFR African American >60 >60 ml/min/1.73m2    GFR Non- >60 >60 ml/min/1.73m2    Calcium 8.4 8.3 - 10.4 MG/DL    Total Bilirubin 0.6 0.2 - 1.1 MG/DL     (H) 12 - 65 U/L    AST 17 15 - 37 U/L    Alk Phosphatase 111 50 - 136 U/L    Total Protein 6.2 (L) 6.3 - 8.2 g/dL    Albumin 2.9 (L) 3.5 - 5.0 g/dL    Globulin 3.3 2.3 - 3.5 g/dL    Albumin/Globulin Ratio 0.9 (L) 1.2 - 3.5     Lipase    Collection Time: 06/10/22  5:41 AM   Result Value Ref Range    Lipase 533 (H) 73 - 393 U/L   CBC with Auto Differential    Collection Time: 06/11/22  5:51 AM   Result Value Ref Range    WBC 12.4 (H) 4.3 - 11.1 K/uL    RBC 4.11 4.05 - 5.2 M/uL    Hemoglobin 12.1 11.7 - 15.4 g/dL    Hematocrit 37.0 35.8 - 46.3 %    MCV 90.0 79.6 - 97.8 FL    MCH 29.4 26.1 - 32.9 PG    MCHC 32.7 31.4 - 35.0 g/dL    RDW 12.6 11.9 - 14.6 %    Platelets 743 207 - 256 K/uL    MPV 12.4 (H) 9.4 - 12.3 FL    nRBC 0.00 0.0 - 0.2 K/uL    Differential Type AUTOMATED      Seg Neutrophils 88 (H) 43 - 78 %    Lymphocytes 6 (L) 13 - 44 %    Monocytes 6 4.0 - 12.0 %    Eosinophils % 0 (L) 0.5 - 7.8 %    Basophils 0 0.0 - 2.0 %    Immature Granulocytes 0 0.0 - 5.0 %    Segs Absolute 11.0 (H) 1.7 - 8.2 K/UL    Absolute Lymph # 0.7 0.5 - 4.6 K/UL    Absolute Mono # 0.7 0.1 - 1.3 K/UL    Absolute Eos # 0.0 0.0 - 0.8 K/UL    Basophils Absolute 0.0 0.0 - 0.2 K/UL    Absolute Immature Granulocyte 0.1 0.0 - 0.5 K/UL   Comprehensive Metabolic Panel    Collection Time: 06/11/22  5:51 AM   Result Value Ref Range    Sodium 138 136 - 145 mmol/L    Potassium 3.9 3.5 - 5.1 mmol/L    Chloride 105 98 - 107 mmol/L    CO2 26 21 - 32 mmol/L    Anion Gap 7 7 - 16 mmol/L    Glucose 107 (H) 65 - 100 mg/dL    BUN 4 (L) 6 - 23 MG/DL    CREATININE 0.40 (L) 0.6 - 1.0 MG/DL    GFR African American >60 >60 ml/min/1.73m2    GFR Non- >60 >60 ml/min/1.73m2    Calcium 9.7 8.3 - 10.4 MG/DL    Total Bilirubin 0.5 0.2 - 1.1 MG/DL     (H) 12 - 65 U/L    AST 25 15 - 37 U/L    Alk Phosphatase 133 50 - 136 U/L    Total Protein 6.8 6.3 - 8.2 g/dL    Albumin 2.9 (L) 3.5 - 5.0 g/dL    Globulin 3.9 (H) 2.3 - 3.5 g/dL    Albumin/Globulin Ratio 0.7 (L) 1.2 - 3.5         Other Studies:  FL ERCP BILIARY AND PANCREATIC S&I   Final Result   Status post ERCP without residual filling defect status post balloon   sweep. US ABDOMEN LIMITED Specify organ? GALLBLADDER   Final Result   1. Cholelithiasis with mild gallbladder wall thickening raising the possibility   of acute or chronic cholecystitis. 2. Dilated common bile duct as well as intrahepatic biliary ductal dilatation. A   distal stone or stricture could account for this appearance. An MRCP or ERCP may   be beneficial there is further clinical concern.              Current Meds:  Current Facility-Administered Medications   Medication Dose Route Frequency    dextrose 5 % and 0.45 % NaCl with KCl 20 mEq infusion   IntraVENous Continuous    enoxaparin (LOVENOX) injection 40 mg  40 mg SubCUTAneous Q24H    famotidine (PEPCID) tablet 20 mg  20 mg Oral BID    ondansetron (ZOFRAN-ODT) disintegrating tablet 4 mg  4 mg Oral Q8H PRN    Or    ondansetron (ZOFRAN) injection 4 mg  4 mg IntraVENous Q6H PRN    acetaminophen (TYLENOL) tablet 650 mg 650 mg Oral Q6H PRN    piperacillin-tazobactam (ZOSYN) 3,375 mg in sodium chloride 0.9 % 50 mL IVPB (mini-bag)  3,375 mg IntraVENous q8h    HYDROmorphone HCl PF (DILAUDID) injection 0.5 mg  0.5 mg IntraVENous Q4H PRN    oxyCODONE (ROXICODONE) immediate release tablet 5 mg  5 mg Oral Q6H PRN    glucagon (rDNA) injection 1 mg  1 mg IntraVENous Once       Signed:  Eneida Vazquez MD    Part of this note may have been written by using a voice dictation software. The note has been proof read but may still contain some grammatical/other typographical errors.

## 2022-06-13 ENCOUNTER — TELEPHONE (OUTPATIENT)
Dept: SURGERY | Age: 22
End: 2022-06-13

## 2022-06-13 ENCOUNTER — HOSPITAL ENCOUNTER (EMERGENCY)
Age: 22
Discharge: HOME | End: 2022-06-13
Attending: EMERGENCY MEDICINE | Admitting: EMERGENCY MEDICINE

## 2022-06-13 VITALS
DIASTOLIC BLOOD PRESSURE: 78 MMHG | OXYGEN SATURATION: 98 % | TEMPERATURE: 98.8 F | RESPIRATION RATE: 16 BRPM | BODY MASS INDEX: 32.15 KG/M2 | HEIGHT: 65 IN | SYSTOLIC BLOOD PRESSURE: 125 MMHG | HEART RATE: 104 BPM | WEIGHT: 193 LBS

## 2022-06-13 DIAGNOSIS — R11.2 NAUSEA AND VOMITING, INTRACTABILITY OF VOMITING NOT SPECIFIED, UNSPECIFIED VOMITING TYPE: ICD-10-CM

## 2022-06-13 DIAGNOSIS — Z90.49 STATUS POST LAPAROSCOPIC CHOLECYSTECTOMY: Primary | ICD-10-CM

## 2022-06-13 LAB
ALBUMIN SERPL-MCNC: 3.1 G/DL (ref 3.5–5)
ALBUMIN/GLOB SERPL: 0.7 {RATIO} (ref 1.2–3.5)
ALP SERPL-CCNC: 139 U/L (ref 50–136)
ALT SERPL-CCNC: 127 U/L (ref 12–65)
ANION GAP SERPL CALC-SCNC: 10 MMOL/L (ref 7–16)
AST SERPL-CCNC: 30 U/L (ref 15–37)
BACTERIA SPEC CULT: NORMAL
BACTERIA SPEC CULT: NORMAL
BASOPHILS # BLD: 0 K/UL (ref 0–0.2)
BASOPHILS NFR BLD: 0 % (ref 0–2)
BILIRUB SERPL-MCNC: 0.5 MG/DL (ref 0.2–1.1)
BILIRUB UR QL: ABNORMAL
BUN SERPL-MCNC: 3 MG/DL (ref 6–23)
CALCIUM SERPL-MCNC: 9.5 MG/DL (ref 8.3–10.4)
CHLORIDE SERPL-SCNC: 102 MMOL/L (ref 98–107)
CO2 SERPL-SCNC: 26 MMOL/L (ref 21–32)
CREAT SERPL-MCNC: 0.5 MG/DL (ref 0.6–1)
DIFFERENTIAL METHOD BLD: ABNORMAL
EOSINOPHIL # BLD: 0.2 K/UL (ref 0–0.8)
EOSINOPHIL NFR BLD: 1 % (ref 0.5–7.8)
ERYTHROCYTE [DISTWIDTH] IN BLOOD BY AUTOMATED COUNT: 12.7 % (ref 11.9–14.6)
ERYTHROCYTE [DISTWIDTH] IN BLOOD BY AUTOMATED COUNT: 12.8 % (ref 11.9–14.6)
GLOBULIN SER CALC-MCNC: 4.2 G/DL (ref 2.3–3.5)
GLUCOSE SERPL-MCNC: 89 MG/DL (ref 65–100)
GLUCOSE UR QL STRIP.AUTO: NEGATIVE MG/DL
HCG UR QL: NEGATIVE
HCT VFR BLD AUTO: 33.8 % (ref 35.8–46.3)
HCT VFR BLD AUTO: 39.3 % (ref 35.8–46.3)
HGB BLD-MCNC: 11.3 G/DL (ref 11.7–15.4)
HGB BLD-MCNC: 13.1 G/DL (ref 11.7–15.4)
IMM GRANULOCYTES # BLD AUTO: 0.1 K/UL (ref 0–0.5)
IMM GRANULOCYTES NFR BLD AUTO: 0 % (ref 0–5)
KETONES UR-MCNC: >160 MG/DL
LACTATE SERPL-SCNC: 0.8 MMOL/L (ref 0.4–2)
LEUKOCYTE ESTERASE UR QL STRIP: NEGATIVE
LIPASE SERPL-CCNC: 138 U/L (ref 73–393)
LYMPHOCYTES # BLD: 1.5 K/UL (ref 0.5–4.6)
LYMPHOCYTES NFR BLD: 8 % (ref 13–44)
MAGNESIUM SERPL-MCNC: 2.1 MG/DL (ref 1.8–2.4)
MCH RBC QN AUTO: 29 PG (ref 26.1–32.9)
MCH RBC QN AUTO: 29.4 PG (ref 26.1–32.9)
MCHC RBC AUTO-ENTMCNC: 33.3 G/DL (ref 31.4–35)
MCHC RBC AUTO-ENTMCNC: 33.4 G/DL (ref 31.4–35)
MCV RBC AUTO: 87.1 FL (ref 79.6–97.8)
MCV RBC AUTO: 88 FL (ref 79.6–97.8)
MONOCYTES # BLD: 1.4 K/UL (ref 0.1–1.3)
MONOCYTES NFR BLD: 8 % (ref 4–12)
NEUTS SEG # BLD: 15.6 K/UL (ref 1.7–8.2)
NEUTS SEG NFR BLD: 83 % (ref 43–78)
NITRITE UR QL: NEGATIVE
NRBC # BLD: 0 K/UL (ref 0–0.2)
NRBC # BLD: 0 K/UL (ref 0–0.2)
PH UR: 6.5 [PH] (ref 5–9)
PLATELET # BLD AUTO: 216 K/UL (ref 150–450)
PLATELET # BLD AUTO: 231 K/UL (ref 150–450)
PMV BLD AUTO: 11.9 FL (ref 9.4–12.3)
PMV BLD AUTO: 12.3 FL (ref 9.4–12.3)
POTASSIUM SERPL-SCNC: 3.5 MMOL/L (ref 3.5–5.1)
PROT SERPL-MCNC: 7.3 G/DL (ref 6.3–8.2)
PROT UR QL: ABNORMAL MG/DL
RBC # BLD AUTO: 3.84 M/UL (ref 4.05–5.2)
RBC # BLD AUTO: 4.51 M/UL (ref 4.05–5.2)
RBC # UR STRIP: NEGATIVE /UL
SERVICE CMNT-IMP: ABNORMAL
SERVICE CMNT-IMP: NORMAL
SERVICE CMNT-IMP: NORMAL
SODIUM SERPL-SCNC: 138 MMOL/L (ref 136–145)
SP GR UR: 1.02 (ref 1–1.02)
UROBILINOGEN UR QL: 0.2 EU/DL (ref 0.2–1)
WBC # BLD AUTO: 16.9 K/UL (ref 4.3–11.1)
WBC # BLD AUTO: 18.8 K/UL (ref 4.3–11.1)

## 2022-06-13 PROCEDURE — 81003 URINALYSIS AUTO W/O SCOPE: CPT

## 2022-06-13 PROCEDURE — 85025 COMPLETE CBC W/AUTO DIFF WBC: CPT

## 2022-06-13 PROCEDURE — 83690 ASSAY OF LIPASE: CPT

## 2022-06-13 PROCEDURE — 96374 THER/PROPH/DIAG INJ IV PUSH: CPT

## 2022-06-13 PROCEDURE — C9113 INJ PANTOPRAZOLE SODIUM, VIA: HCPCS | Performed by: EMERGENCY MEDICINE

## 2022-06-13 PROCEDURE — 6360000002 HC RX W HCPCS: Performed by: EMERGENCY MEDICINE

## 2022-06-13 PROCEDURE — 83735 ASSAY OF MAGNESIUM: CPT

## 2022-06-13 PROCEDURE — 96375 TX/PRO/DX INJ NEW DRUG ADDON: CPT

## 2022-06-13 PROCEDURE — 6370000000 HC RX 637 (ALT 250 FOR IP): Performed by: EMERGENCY MEDICINE

## 2022-06-13 PROCEDURE — A4216 STERILE WATER/SALINE, 10 ML: HCPCS | Performed by: EMERGENCY MEDICINE

## 2022-06-13 PROCEDURE — 81025 URINE PREGNANCY TEST: CPT

## 2022-06-13 PROCEDURE — 2580000003 HC RX 258: Performed by: EMERGENCY MEDICINE

## 2022-06-13 PROCEDURE — 99284 EMERGENCY DEPT VISIT MOD MDM: CPT

## 2022-06-13 PROCEDURE — 83605 ASSAY OF LACTIC ACID: CPT

## 2022-06-13 PROCEDURE — 80053 COMPREHEN METABOLIC PANEL: CPT

## 2022-06-13 PROCEDURE — 85027 COMPLETE CBC AUTOMATED: CPT

## 2022-06-13 RX ORDER — 0.9 % SODIUM CHLORIDE 0.9 %
1000 INTRAVENOUS SOLUTION INTRAVENOUS
Status: COMPLETED | OUTPATIENT
Start: 2022-06-13 | End: 2022-06-13

## 2022-06-13 RX ORDER — PANTOPRAZOLE SODIUM 40 MG/1
40 TABLET, DELAYED RELEASE ORAL
Qty: 14 TABLET | Refills: 1 | Status: SHIPPED | OUTPATIENT
Start: 2022-06-13

## 2022-06-13 RX ORDER — ONDANSETRON 2 MG/ML
4 INJECTION INTRAMUSCULAR; INTRAVENOUS
Status: COMPLETED | OUTPATIENT
Start: 2022-06-13 | End: 2022-06-13

## 2022-06-13 RX ORDER — SODIUM CHLORIDE, SODIUM LACTATE, POTASSIUM CHLORIDE, AND CALCIUM CHLORIDE .6; .31; .03; .02 G/100ML; G/100ML; G/100ML; G/100ML
1000 INJECTION, SOLUTION INTRAVENOUS
Status: COMPLETED | OUTPATIENT
Start: 2022-06-13 | End: 2022-06-13

## 2022-06-13 RX ORDER — ONDANSETRON 4 MG/1
4 TABLET, FILM COATED ORAL 3 TIMES DAILY PRN
Qty: 15 TABLET | Refills: 0 | Status: SHIPPED | OUTPATIENT
Start: 2022-06-13

## 2022-06-13 RX ORDER — SUCRALFATE 1 G/1
1 TABLET ORAL
Status: COMPLETED | OUTPATIENT
Start: 2022-06-13 | End: 2022-06-13

## 2022-06-13 RX ADMIN — SODIUM CHLORIDE, POTASSIUM CHLORIDE, SODIUM LACTATE AND CALCIUM CHLORIDE 1000 ML: 600; 310; 30; 20 INJECTION, SOLUTION INTRAVENOUS at 14:41

## 2022-06-13 RX ADMIN — ONDANSETRON 4 MG: 2 INJECTION INTRAMUSCULAR; INTRAVENOUS at 12:12

## 2022-06-13 RX ADMIN — SODIUM CHLORIDE 1000 ML: 9 INJECTION, SOLUTION INTRAVENOUS at 12:11

## 2022-06-13 RX ADMIN — SODIUM CHLORIDE 40 MG: 9 INJECTION, SOLUTION INTRAMUSCULAR; INTRAVENOUS; SUBCUTANEOUS at 13:06

## 2022-06-13 RX ADMIN — SUCRALFATE 1 G: 1 TABLET ORAL at 13:06

## 2022-06-13 ASSESSMENT — PAIN DESCRIPTION - LOCATION
LOCATION: THROAT
LOCATION: THROAT

## 2022-06-13 ASSESSMENT — PAIN DESCRIPTION - DESCRIPTORS
DESCRIPTORS: BURNING
DESCRIPTORS: BURNING

## 2022-06-13 ASSESSMENT — PAIN - FUNCTIONAL ASSESSMENT: PAIN_FUNCTIONAL_ASSESSMENT: 0-10

## 2022-06-13 ASSESSMENT — PAIN SCALES - GENERAL
PAINLEVEL_OUTOF10: 0
PAINLEVEL_OUTOF10: 4
PAINLEVEL_OUTOF10: 3
PAINLEVEL_OUTOF10: 0

## 2022-06-13 NOTE — TELEPHONE ENCOUNTER
She has a lot of nausea and vomiting. She is not having pain. I spoke with Dr Pat Vilchis. He said send her to the ER to be evaluated for possible ileus.

## 2022-06-13 NOTE — ED PROVIDER NOTES
[employer] Emergency Department Provider Note                   PCP:                No primary care provider on file. Age: 23 y.o. Sex: female     No diagnosis found. DISPOSITION         New Prescriptions    No medications on file       Orders Placed This Encounter   Procedures    CBC with Auto Differential    Comprehensive Metabolic Panel    Magnesium    Lipase    Lactic Acid    POCT Urine Dipstick    Continuous Pulse Oximetry    POC Urine Preg (Select for females age 6-55)    POCT Urinalysis no Micro    POC Pregnancy Urine Norma Cooper MD, MD 12:04 PM      MDM  Number of Diagnoses or Management Options  Nausea and vomiting, intractability of vomiting not specified, unspecified vomiting type  Status post laparoscopic cholecystectomy  Diagnosis management comments: Patient with some nausea and vomiting with advance diet today. He was hydrated reevaluated maintains a soft abdomen. Her white count is somewhat elevated and I review of her old counts shows that she tends to run moderately high white blood cell counts historically. Her lipase fortunately is returned to normal.  She does not have any pancreatic typical pain. She is eating and drinking our department have greeted patient and mother as they were walking out department she was smiling look-alike she felt dramatically better.        Amount and/or Complexity of Data Reviewed  Clinical lab tests: ordered and reviewed  Obtain history from someone other than the patient: yes  Review and summarize past medical records: yes    Risk of Complications, Morbidity, and/or Mortality  Presenting problems: high  Diagnostic procedures: high  Management options: high    Patient Progress  Patient progress: marie       Kurtis Kaye is a 23 y.o. female who presents to the Emergency Department with chief complaint of    Chief Complaint   Patient presents with    Emesis      Last week patient came in the hospital with abdominal discomfort found to have pancreatitis subsequently underwent cholecystectomy. This morning she represents with abdominal pain and nausea with vomiting x2. No known fever. No abdominal distention. Had ERCP with sphincterotomy. In our department she feels dramatically better is hungry wishing to leave and eat she has taken in liquids without difficulty. She has not developed a temperature during this time. She has some regression of elevation of white count with hydration and lack of vomiting. Patient and accompanying mother are aware that they should return with any worsening. The history is provided by the patient and a parent. Emesis  The current episode started 6 to 12 hours ago. Pertinent negatives include no chest pain, no headaches and no shortness of breath. Nothing aggravates the symptoms. She has tried nothing for the symptoms. Review of Systems   Constitutional: Negative for chills, diaphoresis and fever. Respiratory: Negative for shortness of breath. Cardiovascular: Negative for chest pain. Gastrointestinal: Positive for vomiting. Neurological: Negative for headaches. All other systems reviewed and are negative. No past medical history on file. Past Surgical History:   Procedure Laterality Date    CHOLECYSTECTOMY, LAPAROSCOPIC N/A 6/10/2022    CHOLECYSTECTOMY LAPAROSCOPIC/  performed by [facility]quelin [facility] at [facility] MA[facility] OR    ERCP N/A 6/8/2022    ERCP SPHINCTER/PAPILLOTOMY with balloon sweep performed by Hector Newman MD at [facility] ENDOSCOPY    ERCP W/ SPHICTEROTOMY N/A 06/08/2022        No family history on file.         Social Connections:     Frequency of Communication with Friends and Family: Not on file    Frequency of Social Gatherings with Friends and Family: Not on file    Attends Scientologist Services: Not on file   [employer] or Organizations: Not on file    Attends Club or Organization Meetings: Not on file   Candace Marital Status: Not on file        No Known Allergies     Vitals signs and nursing note reviewed. Patient Vitals for the past 4 hrs:   Temp Pulse Resp BP SpO2   06/13/22 1044 -- 91 -- 129/85 97 %   06/13/22 1003 98.4 °F (36.9 °C) (!) 103 16 116/85 97 %          Physical Exam  Vitals and nursing note reviewed. HENT:      Head: Atraumatic. Right Ear: External ear normal.      Left Ear: External ear normal.      Nose: Nose normal.      Mouth/Throat:      Comments: Slightly dry mucous membranes  Eyes:      General: No scleral icterus. Cardiovascular:      Rate and Rhythm: Normal rate and regular rhythm. Pulses: Normal pulses. Pulmonary:      Effort: Pulmonary effort is normal.      Breath sounds: Normal breath sounds. No wheezing or rhonchi. Abdominal:      General: Abdomen is flat. Tenderness: There is no guarding or rebound. Comments: Abdomen is flat all dressings are dry with no evidence of any purulent or bloody drainage. Able to palpate all quadrants without any pain response. No palpable mass. Very benign abdominal exam.  No CVA tenderness   Musculoskeletal:         General: No tenderness. Cervical back: Normal range of motion. Skin:     General: Skin is warm and dry. Coloration: Skin is not jaundiced. Neurological:      Mental Status: She is alert. Mental status is at baseline.    Psychiatric:         Behavior: Behavior normal.          Procedures      Labs Reviewed   CBC WITH AUTO DIFFERENTIAL - Abnormal; Notable for the following components:       Result Value    WBC 18.8 (*)     Seg Neutrophils 83 (*)     Lymphocytes 8 (*)     Segs Absolute 15.6 (*)     Absolute Mono # 1.4 (*)     All other components within normal limits   COMPREHENSIVE METABOLIC PANEL - Abnormal; Notable for the following components:    BUN 3 (*)     CREATININE 0.50 (*)      (*)     Alk Phosphatase 139 (*)     Albumin 3.1 (*)     Globulin 4.2 (*)     Albumin/Globulin Ratio 0.7 (*)     All other components within normal limits   POCT URINALYSIS DIPSTICK - Abnormal; Notable for the following components:    Protein, Urine, POC TRACE (*)     KETONES, Urine, POC >160 (*)     Bilirubin, Urine, POC SMALL (*)     All other components within normal limits   MAGNESIUM   LIPASE   POC PREGNANCY UR-QUAL   POC PREGNANCY UR-QUAL        No orders to display                          Voice dictation software was used during the making of this note. This software is not perfect and grammatical and other typographical errors may be present. This note has not been completely proofread for errors.      Nazanin Cain MD  06/14/22 5782

## 2022-06-13 NOTE — ED NOTES
I have reviewed discharge instructions with the patient. The patient verbalized understanding. Patient left ED via Discharge Method: ambulatory to Home with mother. Opportunity for questions and clarification provided. Patient given 2 scripts. To continue your aftercare when you leave the hospital, you may receive an automated call from our care team to check in on how you are doing. This is a free service and part of our promise to provide the best care and service to meet your aftercare needs.  If you have questions, or wish to unsubscribe from this service please call [phone]. Thank you for Choosing our [facility] Emergency Department.         Anu Fisher RN  06/13/22 6811

## 2022-06-13 NOTE — ED TRIAGE NOTES
Pt ambulatory to triage. Pt recently had her gallbladder removed, was sent home on Saturday. Pt states she was on a liquid diet for 5 days while in the hospital. Pt was not sent home on special diet, pt was taking an acid reducer while in the hospital but was not sent home on one. Pt states she has not eaten much at all since being home, then this morning she threw up twice and both times it was tan and burned in her throat afterwards. Pt states she felt relief after she threw up, she was able to belch after as well, pt passing flatus and had one episode of diarrhea today. While she was in the hospital, her pancreatic enzymes were elevated, and after pt talked to doctor today they instructed her to come in. Pt denies any abd pain except for some site incision pain, denies fever/chills.

## 2022-06-14 ASSESSMENT — ENCOUNTER SYMPTOMS
VOMITING: 1
SHORTNESS OF BREATH: 0

## 2022-06-17 NOTE — DISCHARGE SUMMARY
Physician Discharge Summary     Patient ID:  Vaughn Shannon  [id number]  23 y.o.  2000    Admit date: 6/8/2022    Discharge date and time: 6/11/2022  2:40 PM     Admitting Physician: Carlos Escobar MD     Discharge Physician: Dr. Keyanna Evans  Admission Diagnoses: Choledocholithiasis [K80.50]  Choledocholithiasis with acute cholecystitis [K80.42]  Acute pancreatitis, unspecified complication status, unspecified pancreatitis type [K85.90]    Discharge Diagnoses: Same as above    Admission Condition: stable    Discharged Condition: Good and improved    Indication for Admission: Surgery    Hospital Course:   HPI: Vaughn Shannon is a 23 y.o. female who came to the ER and was admitted by the Hospitalists with a  3 day h/o progressive N/V/upper abd pain/epigastric pain.     Nothing made pain better or worse. Pain 5/10 on arrival   No associated fever. She has been known to have gallstones for 3 yrs but trying homeopathic treatment instead of surgery     In ER WBC 23.6k, T Bili 0.9, AST/ALT 87/383, alk phosp 166  Lipase 6486     GI and General Surgery were consulted by MUNA Chapman  She had ERCP on day of admission  6/8/22 s/p ERCP; Dr Rebekah Moseley  CBD 15mm, minimal sludge and likely a prior passed stone; pancreas normal, GB not seen           6/8/22 RUQ Abd US  Homogeneous echotexture throughout the liver. There is mild    intrahepatic biliary ductal dilatation. There is no focal hepatic mass.       Gallstones are present within the gallbladder lumen. There is gallbladder wall   thickening measuring 5 mm. No pericholecystic fluid. There was a   reported negative sonographic Jackson's sign. CBD dilated measuring 14mm.           Visualized portions of the pancreas were unremarkable however the tail was   obscured by overlying bowel gas. The right kidney is unremarkable without   hydronephrosis or solid mass. The right kidney was measured at approximately 11.1cm.            The visualized portions of the nurse which is preferred -->[phone])    Diet  Small volume meals.   Avoid fatty/greasy foods for at least 2 weeks         Signed:    Rell Leonardo NP    6/17/2022  4:24 PM

## 2022-06-22 ENCOUNTER — OFFICE VISIT (OUTPATIENT)
Dept: SURGERY | Age: 22
End: 2022-06-22

## 2022-06-22 VITALS — HEIGHT: 65 IN | WEIGHT: 193 LBS | OXYGEN SATURATION: 98 % | HEART RATE: 93 BPM | BODY MASS INDEX: 32.15 KG/M2

## 2022-06-22 DIAGNOSIS — K85.10 GALLSTONE PANCREATITIS: ICD-10-CM

## 2022-06-22 DIAGNOSIS — K80.42 CHOLEDOCHOLITHIASIS WITH ACUTE CHOLECYSTITIS: Primary | ICD-10-CM

## 2022-06-22 NOTE — PROGRESS NOTES
H&P/Consult Note/Progress Note/Office Note:   Felton Allen  MRN: [id number]  MRI:5/06/5479  Age:22 y.o.    HPI: Felton Allen is a 23 y.o. female who came to the ER and was admitted by the Hospitalists with a  3 day h/o progressive N/V/upper abd pain/epigastric pain. Nothing made pain better or worse. Pain 5/10 on arrival   No associated fever. She has been known to have gallstones for 3 yrs but trying homeopathic treatment instead of surgery    In ER WBC 23.6k, T Bili 0.9, AST/ALT 87/383, alk phosp 166  Lipase 6486    GI and General Surgery were consulted by MUNA Riggs  She had ERCP on day of admission  6/8/22 s/p ERCP; Dr Whiting Congress  CBD 15mm, minimal sludge and likely a prior passed stone; pancreas normal, GB not seen    She is S/P 6/10/22 s/p lap cholecystectomy; Dr Jose Alfredo Stuart \"GALLBLADDER\": [address]; CHOLELITHIASIS. Sign Out Date: 6/14/2022  Edinson Gorman MD         6/8/22 RUQ Abd US  Homogeneous echotexture throughout the liver. There is mild    intrahepatic biliary ductal dilatation. There is no focal hepatic mass. Gallstones are present within the gallbladder lumen. There is gallbladder wall   thickening measuring 5 mm. No pericholecystic fluid. There was a   reported negative sonographic Jackson's sign. CBD dilated measuring 14mm. Visualized portions of the pancreas were unremarkable however the tail was   obscured by overlying bowel gas. The right kidney is unremarkable without   hydronephrosis or solid mass. The right kidney was measured at approximately 11.1cm. The visualized portions of the abdominal aorta and inferior vena cava are   unremarkable. There is normal hepatopetal flow within the main portal vein. No   free fluid is seen within the right upper quadrant. mpression   1. Cholelithiasis with mild gallbladder wall thickening raising the possibility   of acute or chronic cholecystitis.    2. Dilated common bile duct as well as intrahepatic biliary ductal dilatation. A   distal stone or stricture could account for this appearance. An MRCP or ERCP may   be beneficial there is further clinical concern. Additonal hx:  6/10/22 Feels better; some epigastric discomfort but much improved compared to admission, doesn't feel distended; WBC lower at 23.6k-->14.7k, T Bili normal; Lipase 533; Proceed with lap annamaria today            History reviewed. No pertinent past medical history. Past Surgical History:   Procedure Laterality Date    CHOLECYSTECTOMY, LAPAROSCOPIC N/A 6/10/2022    CHOLECYSTECTOMY LAPAROSCOPIC/  performed by Ximena Martin MD at [facility] MAIN OR    ERCP N/A 6/8/2022    ERCP SPHINCTER/PAPILLOTOMY with balloon sweep performed by JEMMA Rodriguez MD at [facility] ENDOSCOPY    ERCP W/ SPHICTEROTOMY N/A 06/08/2022     Current Outpatient Medications   Medication Sig    ondansetron (ZOFRAN) 4 MG tablet Take 1 tablet by mouth 3 times daily as needed for Nausea or Vomiting    pantoprazole (PROTONIX) 40 MG tablet Take 1 tablet by mouth every morning (before breakfast)     No current facility-administered medications for this visit. Patient has no known allergies.   Social History     Socioeconomic History    Marital status: Single     Spouse name: None    Number of children: None    Years of education: None    Highest education level: None   Occupational History    None   Tobacco Use    Smoking status: Never Smoker    Smokeless tobacco: Never Used   Substance and Sexual Activity    Alcohol use: None    Drug use: None    Sexual activity: None   Other Topics Concern    None   Social History Narrative    None     Social Determinants of Health     Financial Resource Strain:     Difficulty of Paying Living Expenses: Not on file   Food Insecurity:     Worried About Running Out of Food in the Last Year: Not on file    Dm of Food in the Last Year: Not on file   Transportation Needs:  Lack of Transportation (Medical): Not on file    Lack of Transportation (Non-Medical): Not on file   Physical Activity:     Days of Exercise per Week: Not on file    Minutes of Exercise per Session: Not on file   Stress:     Feeling of Stress : Not on file   Social Connections:     Frequency of Communication with Friends and Family: Not on file    Frequency of Social Gatherings with Friends and Family: Not on file    Attends Anglican Services: Not on file    Active Member of [address] [place] [address] or Organizations: Not on file    Attends Club or Organization Meetings: Not on file    Marital Status: Not on file   Intimate Partner Violence:     Fear of Current or Ex-Partner: Not on file    Emotionally Abused: Not on file    Physically Abused: Not on file    Sexually Abused: Not on file   Housing Stability:     Unable to Pay for Housing in the Last Year: Not on file    Number of [place] in the Last Year: Not on file    Unstable Housing in the Last Year: Not on file     Social History     Tobacco Use   Smoking Status Never Smoker   Smokeless Tobacco Never Used     History reviewed. No pertinent family history. ROS: The patient has no difficulty with chest pain or shortness of breath. No fever or chills. Comprehensive review of systems was otherwise unremarkable except as noted above. Physical Exam:   Pulse 93   Ht 5' 5\" (1.651 m)   Wt 193 lb (87.5 kg)   SpO2 98%   BMI 32.12 kg/m²   Vitals:    06/22/22 1501   Pulse: 93   SpO2: 98%   Weight: 193 lb (87.5 kg)   Height: 5' 5\" (1.651 m)     No intake/output data recorded. 06/08 1901 - 06/10 0700  In: 1291 [P.O.:300; I.V.:911]  Out: -     Constitutional: Alert, oriented, cooperative patient in no acute distress; appears stated age    Eyes:Sclera are clear. EOMs intact  ENMT: no external lesions gross hearing normal; no obvious neck masses, no ear or lip lesions, nares normal  CV: RRR. Normal perfusion  Resp: No JVD.   Breathing is  non-labored; no audible wheezing. GI: soft and non-distended; surgical dressings removed. Staples removed from all trochar sites. New dressings applied steri strips/tegaderm. Focal erythema from staples at skin. Neuro:  Oriented; moves all 4; no focal deficits  Psychiatric: normal affect and mood, no memory impairment    Recent vitals (if inpt):  Patient Vitals for the past 24 hrs:   BP Temp Temp src Pulse Resp SpO2 Height Weight   06/10/22 1136  99.2 °F (37.3 °C) Oral 90 16 97 % 5' 5\" (1.651 m) 193 lb (87.5 kg)   06/10/22 0700 113/68 98.1 °F (36.7 °C) Oral 88 16 97 %     06/10/22 0547     16      06/10/22 0325 126/77 98.1 °F (36.7 °C) Oral 84 15 97 %     06/09/22 2334 136/76 98.4 °F (36.9 °C) Oral 84 15 99 %     06/09/22 2001 120/70 98.4 °F (36.9 °C) Oral 76 17 99 %     06/09/22 1613 126/78 99.1 °F (37.3 °C) Oral 78 16 99 %         Amount and/or Complexity of Data Reviewed and Analyzed:  I reviewed and analyzed all of the unique labs and radiologic studies that are shown below as well as any that are in the HPI, and any that are in the expanded problem list below  *Each unique test, order, or document contributes to the combination of 2 or combination of 3 in Category 1 below. For this visit I also reviewed old records and prior notes. No results for input(s): WBC, HGB, PLT, NA, K, CL, CO2, BUN, CREA, GLU, INR, APTT, ALT, AML, AML, LCAD, PCO2, PO2, HCO3 in the last 72 hours.     Invalid input(s): PTP, TBIL, TBILI, CBIL, SGOT, GPT, AP, LPSE, NH4, TROPT, TROIQ,  PH  Review of most recent CBC  Lab Results   Component Value Date    WBC 16.9 (H) 06/13/2022    HGB 11.3 (L) 06/13/2022    HCT 33.8 (L) 06/13/2022    MCV 88.0 06/13/2022     06/13/2022       Review of most recent BMP  Lab Results   Component Value Date     06/13/2022    K 3.5 06/13/2022     06/13/2022    CO2 26 06/13/2022    BUN 3 06/13/2022    CREATININE 0.50 06/13/2022    GLUCOSE 89 06/13/2022    CALCIUM 9.5 06/13/2022 Review of most recent LFTs (and lipase if done)  Lab Results   Component Value Date     (H) 06/13/2022    AST 30 06/13/2022    ALKPHOS 139 (H) 06/13/2022    BILITOT 0.5 06/13/2022     Lab Results   Component Value Date    LIPASE 138 06/13/2022       No results found for: INR, APTT, LCAD    Review of most recent HgbA1c  No results found for: LABA1C  No results found for: EAG    Nutritional assessment screen for wound healing issues:  Lab Results   Component Value Date    LABALBU 3.1 (L) 06/13/2022       @lastcovr@    Xray Result (most recent):  No results found for this or any previous visit from the past 3650 days. CT Result (most recent):  No results found for this or any previous visit from the past 3650 days. US Result (most recent):  US ABDOMEN LIMITED 06/08/2022    Narrative  Right upper quadrant ultrasound    HISTORY: Nausea and vomiting, history of gallstones    Real-time sonography of the right upper quadrant was performed. Comparison: None    FINDINGS: There is homogeneous echotexture throughout the liver. There is mild  intrahepatic biliary ductal dilatation. There is no focal hepatic mass. Gallstones are present within the gallbladder lumen. There is gallbladder wall  thickening measuring 5 mm. There is no pericholecystic fluid. There was a  reported negative sonographic Jackson's sign. The common bile duct is dilated  measuring 14 mm. The visualized portions of the pancreas were unremarkable however the tail was  obscured by overlying bowel gas. The right kidney is unremarkable without  hydronephrosis or solid mass. The right kidney was measured at approximately  11.1 cm. The visualized portions of the abdominal aorta and inferior vena cava are  unremarkable. There is normal hepatopetal flow within the main portal vein. No  free fluid is seen within the right upper quadrant. Impression  1.  Cholelithiasis with mild gallbladder wall thickening raising the possibility  of Morbidity or Mortality of pt Management     [id number]  [id number]  Minimal  1self-limited or minor problem Minimal or none Minimal risk of morbidity from additional diagnostic testing or Rx   02635  61843 Low Low  2or more self-limited or minor problems;    or  1stable chronic illness;    or  5LZUKV, uncomplicated illness or injury   Limited  (Must meet the requirements of at least 1 of the 2 categories)  Category 1: Tests and documents   Any combination of 2 from the following:  Review of prior external note(s) from each unique source*;  review of the result(s) of each unique test*;   ordering of each unique test*    or   Category 2: Assessment requiring an independent historian(s)  (For the categories of independent interpretation of tests and discussion of management or test interpretation, see moderate or high) Low risk of morbidity from additional diagnostic testing or treatment     32108  62664 Mod Moderate  1or more chronic illnesses with exacerbation, progression, or side effects of treatment;    or  2or more stable chronic illnesses;    or  1undiagnosed new problem with uncertain prognosis;    or  1acute illness with systemic symptoms;    or  2UMQFD complicated injury   Moderate  (Must meet the requirements of at least 1 out of 3 categories)  Category 1: Tests, documents, or independent historian(s)  Any combination of 3 from the following:   Review of prior external note(s) from each unique source*;  Review of the result(s) of each unique test*;  Ordering of each unique test*;  Assessment requiring an independent historian(s)    or  Category 2: Independent interpretation of tests   Independent interpretation of a test performed by another physician/other qualified health care professional (not separately reported);     or  Category 3: Discussion of management or test interpretation  Discussion of management or test interpretation with external physician/other qualified health care management  service on this patient. I have independently seen the patient. I have independently obtained the above history from the patient/family. I have independently examined the patient with above findings. I have independently reviewed data/labs for this patient and developed the above plan of care (MDM).       Signed: JOE Amaral CNP  6/22/2022    3:16 PM

## (undated) DEVICE — TROCAR: Brand: KII® SLEEVE

## (undated) DEVICE — INTENDED FOR TISSUE SEPARATION, AND OTHER PROCEDURES THAT REQUIRE A SHARP SURGICAL BLADE TO PUNCTURE OR CUT.: Brand: BARD-PARKER ® STAINLESS STEEL BLADES

## (undated) DEVICE — SUTURE SZ 0 27IN 5/8 CIR UR-6  TAPER PT VIOLET ABSRB VICRYL J603H

## (undated) DEVICE — GLOVE SURG SZ 65 CRM LTX FREE POLYISOPRENE POLYMER BEAD ANTI

## (undated) DEVICE — TROCAR: Brand: KII FIOS FIRST ENTRY

## (undated) DEVICE — 3M™ TEGADERM™ TRANSPARENT FILM DRESSING FRAME STYLE, 1624W, 2-3/8 IN X 2-3/4 IN (6 CM X 7 CM), 100/CT 4CT/CASE: Brand: 3M™ TEGADERM™

## (undated) DEVICE — AIRLIFE™ OXYGEN TUBING 7 FEET (2.1 M) CRUSH RESISTANT OXYGEN TUBING, VINYL TIPPED: Brand: AIRLIFE™

## (undated) DEVICE — HIGH PERFORMANCE GUIDEWIRE: Brand: DREAMWIRE

## (undated) DEVICE — SYRINGE, LUER SLIP, STERILE, 60ML: Brand: MEDLINE

## (undated) DEVICE — 1200 GUARD II KIT W/5MM TUBE W/O VAC TUBE: Brand: GUARDIAN

## (undated) DEVICE — BLOCK BITE AD 60FR W/ VELC STRP ADDRESSES MOST PT AND

## (undated) DEVICE — TROCARS: Brand: KII® BLUNT TIP ACCESS SYSTEM

## (undated) DEVICE — GLOVE SURG SZ 7 L12IN FNGR THK79MIL GRN LTX FREE

## (undated) DEVICE — NEEDLE SYR 18GA L1.5IN RED PLAS HUB S STL BLNT FILL W/O

## (undated) DEVICE — LUBE JELLY FOIL PACK 1.4 OZ: Brand: MEDLINE INDUSTRIES, INC.

## (undated) DEVICE — PUMP SUC IRR TBNG L10FT W/ HNDPC ASSEMB STRYKEFLOW 2

## (undated) DEVICE — SINGLE PORT MANIFOLD: Brand: NEPTUNE 2

## (undated) DEVICE — SYRINGE MED 3ML CLR PLAS STD N CTRL LUERLOCK TIP DISP

## (undated) DEVICE — YANKAUER,BULB TIP,W/O VENT,RIGID,STERILE: Brand: MEDLINE

## (undated) DEVICE — APPLIER CLP M L L11.4IN DIA10MM ENDOSCP ROT MULT FOR LIG

## (undated) DEVICE — GLOVE SURG SZ 65 THK91MIL LTX FREE SYN POLYISOPRENE

## (undated) DEVICE — THE TORRENT IRRIGATION TUBING IS INTENDED TO PROVIDE IRRIGATION VIA IRRIGATION FLUIDS, SUCH AS STERILE WATER, DURING GASTROINTESTINAL ENDOSCOPIC PROCEDURES WHEN USED IN CONJUNCTION WITH AN IRRIGATION PUMP OR ELECTROSURGICAL UNIT.: Brand: TORRENT

## (undated) DEVICE — KENDALL RADIOLUCENT FOAM MONITORING ELECTRODE RECTANGULAR SHAPE: Brand: KENDALL

## (undated) DEVICE — SOLUTION IRRIG 3000ML 0.9% SOD CHL USP UROMATIC PLAS CONT

## (undated) DEVICE — APPLICATOR MEDICATED 26 CC SOLUTION HI LT ORNG CHLORAPREP

## (undated) DEVICE — [HIGH FLOW INSUFFLATOR,  DO NOT USE IF PACKAGE IS DAMAGED,  KEEP DRY,  KEEP AWAY FROM SUNLIGHT,  PROTECT FROM HEAT AND RADIOACTIVE SOURCES.]: Brand: PNEUMOSURE

## (undated) DEVICE — CONNECTOR TBNG OD5-7MM O2 END DISP

## (undated) DEVICE — SOLUTION IRRIG 1000ML 09% SOD CHL USP PIC PLAS CONTAINER

## (undated) DEVICE — PREMIUM WET SKIN PREP TRAY: Brand: MEDLINE INDUSTRIES, INC.

## (undated) DEVICE — BAG SPEC REM 224ML W4XL6IN DIA10MM 1 HND GYN DISP ENDOPCH

## (undated) DEVICE — SYRINGE MED 10ML LUERLOCK TIP W/O SFTY DISP

## (undated) DEVICE — CARDINAL HEALTH FLEXIBLE LIGHT HANDLE COVER: Brand: CARDINAL HEALTH

## (undated) DEVICE — GENERAL LAPAROSCOPY: Brand: MEDLINE INDUSTRIES, INC.

## (undated) DEVICE — CANNULA NSL ORAL AD FOR CAPNOFLEX CO2 O2 AIRLFE

## (undated) DEVICE — ELECTRODE PT RET AD L9FT HI MOIST COND ADH HYDRGEL CORDED

## (undated) DEVICE — SPHINCTEROTOME: Brand: JAGTOME RX 44

## (undated) DEVICE — RETRIEVAL BALLOON CATHETER: Brand: EXTRACTOR™ PRO RX

## (undated) DEVICE — GARMENT,MEDLINE,DVT,SEQUENTIAL,CALF,MD: Brand: MEDLINE

## (undated) DEVICE — 3M™ TEGADERM™ TRANSPARENT FILM DRESSING FRAME STYLE, 1626W, 4 IN X 4-3/4 IN (10 CM X 12 CM), 50/CT 4CT/CASE: Brand: 3M™ TEGADERM™

## (undated) DEVICE — PAD,NON-ADHERENT,3X8,STERILE,LF,1/PK: Brand: MEDLINE

## (undated) DEVICE — LOGICUT SCISSOR LENGTH 320MM: Brand: LOGI - LAPAROSCOPIC INSTRUMENT SYSTEM

## (undated) DEVICE — Device

## (undated) DEVICE — GAUZE,SPONGE,4"X4",12PLY,WOVEN,NS,LF: Brand: MEDLINE